# Patient Record
Sex: MALE | Race: WHITE | NOT HISPANIC OR LATINO | Employment: FULL TIME | URBAN - METROPOLITAN AREA
[De-identification: names, ages, dates, MRNs, and addresses within clinical notes are randomized per-mention and may not be internally consistent; named-entity substitution may affect disease eponyms.]

---

## 2017-07-17 ENCOUNTER — OFFICE VISIT (OUTPATIENT)
Dept: FAMILY MEDICINE | Facility: CLINIC | Age: 32
End: 2017-07-17
Payer: COMMERCIAL

## 2017-07-17 VITALS
WEIGHT: 201.25 LBS | TEMPERATURE: 98 F | SYSTOLIC BLOOD PRESSURE: 114 MMHG | DIASTOLIC BLOOD PRESSURE: 72 MMHG | RESPIRATION RATE: 16 BRPM | HEIGHT: 72 IN | BODY MASS INDEX: 27.26 KG/M2 | HEART RATE: 58 BPM | OXYGEN SATURATION: 99 %
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
## 2017-07-17 PROCEDURE — 99202 OFFICE O/P NEW SF 15 MIN: CPT | Mod: S$GLB,,, | Performed by: FAMILY MEDICINE

## 2017-07-17 PROCEDURE — 99999 PR PBB SHADOW E&M-NEW PATIENT-LVL III: CPT | Mod: PBBFAC,,, | Performed by: FAMILY MEDICINE

## 2017-07-17 RX ORDER — IBUPROFEN 200 MG
200 TABLET ORAL EVERY 6 HOURS PRN
COMMUNITY

## 2017-07-17 RX ORDER — DIPHENHYDRAMINE HCL 12.5MG/5ML
LIQUID (ML) ORAL 4 TIMES DAILY PRN
COMMUNITY
End: 2017-09-26

## 2017-07-26 NOTE — PROGRESS NOTES
Routine Office Visit    Patient Name: Dennis Leos Jr.    : 1985  MRN: [id number]    Subjective:  Dennis is a 32 y.o. male who presents today for:    1.  Bug bite - R arm.   Doesn't hurt, itch a little, no surrounding erythema.  Nothing makes it better or worse.  Happened 3 days ago.  No allergies that he knows of.  His mom wanted him to get checked out.     Past Medical History  History reviewed. No pertinent past medical history.    Past Surgical History  Past Surgical History:   Procedure Laterality Date    APPENDECTOMY      CYST REMOVAL         Family History  History reviewed. No pertinent family history.    Social History  Social History     Social History    Marital status:      Spouse name: N/A    Number of children: N/A    Years of education: N/A     Occupational History    Not on file.     Social History Main Topics    Smoking status: Never Smoker    Smokeless tobacco: Never Used    Alcohol use No    Drug use: No    Sexual activity: Not on file     Other Topics Concern    Not on file     Social History Narrative    No narrative on file       Current Medications  No current outpatient prescriptions on file prior to visit.     No current facility-administered medications on file prior to visit.        Allergies   Review of patient's allergies indicates:  No Known Allergies    Review of Systems (Pertinent positives)  Constititutional: Weight loss, excess fatigue, chills, fever, night sweats, weakness, loss of appetite  Skin: Rash, itching, lesions, color changes  Lungs: Cough, sputum, cough up blood, wheeze  Heart: Chest pain, angina, palpitations, extra heart beats  Stomach/Intestine: Heartburn, Nausea, vomiting, diarrhea, indigestion, bloating, constipation      /72 (BP Location: Right arm, Patient Position: Sitting, BP Method: Manual)   Pulse (!) 58   Temp 97.7 °F (36.5 °C) (Oral)   Resp 16   Ht 6' (1.829 m)   Wt 91.3 kg (201 lb 4.5 oz)   SpO2 99%   BMI 27.30 kg/m²      GENERAL APPEARANCE: in no apparent distress and well developed and well nourished  RESPIRATORY: appears well, vitals normal, no respiratory distress, acyanotic, normal RR, chest clear, no wheezing, crepitations, rhonchi, normal symmetric air entry  HEART: regular rate and rhythm, S1, S2 normal, no murmur, click, rub or gallop.    NEUROLOGIC: normal without focal findings, CN II-XII are intact.    Extremities: warm/well perfused.  No abnormal hair patterns.  No clubbing, cyanosis or edema.    SKIN: +pinpoint papules on R forearm, no surrounding erythema  PSYCH: Alert, oriented x 3, thought content appropriate, speech normal, pleasant and cooperative, good eye contact, well groomed, recall good, concentration/judgement good and apparently average intelligence.    Assessment/Plan:  Dennis Leos Jr. is a 32 y.o. male who presents today for :    Dennis was seen today for insect bite.    Diagnoses and all orders for this visit:    Bug bite, initial encounter  - benign  - f/u if does not resolve in 1 week  - calamine lotion for itchiness, benadryl    F/u PRN   F/u for annual in 1 month

## 2017-08-01 ENCOUNTER — OFFICE VISIT (OUTPATIENT)
Dept: FAMILY MEDICINE | Facility: CLINIC | Age: 32
End: 2017-08-01
Payer: COMMERCIAL

## 2017-08-01 VITALS
SYSTOLIC BLOOD PRESSURE: 106 MMHG | DIASTOLIC BLOOD PRESSURE: 82 MMHG | HEART RATE: 64 BPM | BODY MASS INDEX: 28.09 KG/M2 | TEMPERATURE: 99 F | HEIGHT: 71 IN | WEIGHT: 200.63 LBS | OXYGEN SATURATION: 97 %

## 2017-08-01 DIAGNOSIS — Z00.00 HEALTH CARE MAINTENANCE: ICD-10-CM

## 2017-08-01 DIAGNOSIS — Z13.220 NEED FOR LIPID SCREENING: ICD-10-CM

## 2017-08-01 PROCEDURE — 99999 PR PBB SHADOW E&M-EST. PATIENT-LVL III: CPT | Mod: PBBFAC,,, | Performed by: INTERNAL MEDICINE

## 2017-08-01 PROCEDURE — 99214 OFFICE O/P EST MOD 30 MIN: CPT | Mod: S$GLB,,, | Performed by: INTERNAL MEDICINE

## 2017-08-01 RX ORDER — BENZONATATE 100 MG/1
100 CAPSULE ORAL 3 TIMES DAILY PRN
Qty: 30 CAPSULE | Refills: 0 | Status: SHIPPED | OUTPATIENT
Start: 2017-08-01 | End: 2017-08-11

## 2017-08-01 RX ORDER — CODEINE PHOSPHATE AND GUAIFENESIN 10; 100 MG/5ML; MG/5ML
5 SOLUTION ORAL NIGHTLY PRN
Qty: 180 ML | Refills: 0 | Status: SHIPPED | OUTPATIENT
Start: 2017-08-01 | End: 2017-08-11

## 2017-08-01 NOTE — PROGRESS NOTES
SUBJECTIVE     Chief Complaint   Patient presents with    Cough     x 4 ays with phlem. OTC: Musinex.    Headache       HPI  Dennis Leos Jr. is a 32 y.o. male with multiple medical diagnoses as listed in the medical history and problem list that presents for evaluation of URI since Saturday. Pt reports a dry cough during the day, but it wakes him up in the middle of the night and his cough becomes productive. He also has a frontal lobe headache. He has been taking Mucinex without any improvement of symptoms. +night sweats, but denies any fever or chills. Denies any sick contacts. +recent travel to [place] via car.     PAST MEDICAL HISTORY:  History reviewed. No pertinent past medical history.    PAST SURGICAL HISTORY:  Past Surgical History:   Procedure Laterality Date    APPENDECTOMY      CYST REMOVAL         SOCIAL HISTORY:  Social History     Social History    Marital status:      Spouse name: N/A    Number of children: N/A    Years of education: N/A     Occupational History    Not on file.     Social History Main Topics    Smoking status: Never Smoker    Smokeless tobacco: Never Used    Alcohol use No    Drug use: No    Sexual activity: Not on file     Other Topics Concern    Not on file     Social History Narrative    No narrative on file       FAMILY HISTORY:  Family History   Problem Relation Age of Onset    No Known Problems Mother     Diabetes type II Father        ALLERGIES AND MEDICATIONS: updated and reviewed.  Review of patient's allergies indicates:  No Known Allergies  Current Outpatient Prescriptions   Medication Sig Dispense Refill    diphenhydrAMINE (BENYLIN) 12.5 mg/5 mL liquid Take by mouth 4 (four) times daily as needed for Allergies.      ibuprofen (ADVIL,MOTRIN) 200 MG tablet Take 200 mg by mouth every 6 (six) hours as needed for Pain.      benzonatate (TESSALON) 100 MG capsule Take 1 capsule (100 mg total) by mouth 3 (three) times daily as needed. 30 capsule 0     "guaifenesin-codeine 100-10 mg/5 ml (TUSSI-ORGANIDIN NR)  mg/5 mL syrup Take 5 mLs by mouth nightly as needed for Cough (MAY CAUSE DROWSINESS). 180 mL 0     No current facility-administered medications for this visit.        ROS  Review of Systems   Constitutional: Negative for chills and fever.   HENT: Positive for postnasal drip. Negative for hearing loss and sore throat.    Eyes: Negative for visual disturbance.   Respiratory: Positive for cough. Negative for shortness of breath.    Cardiovascular: Negative for chest pain, palpitations and leg swelling.   Gastrointestinal: Negative for abdominal pain, constipation, diarrhea, nausea and vomiting.   Genitourinary: Negative for dysuria, frequency and urgency.   Musculoskeletal: Negative for arthralgias, joint swelling and myalgias.   Skin: Negative for rash and wound.   Neurological: Positive for headaches.   Psychiatric/Behavioral: Negative for agitation and confusion. The patient is not nervous/anxious.          OBJECTIVE     Physical Exam  Vitals:    08/01/17 1552   BP: 106/82   Pulse: 64   Temp: 98.6 °F (37 °C)    Body mass index is 27.98 kg/m².  Weight: 91 kg (200 lb 9.9 oz)   Height: 5' 11" (180.3 cm)     Physical Exam   Constitutional: He is oriented to person, place, and time. He appears well-developed and well-nourished. No distress.   HENT:   Head: Normocephalic and atraumatic.   Right Ear: External ear normal.   Left Ear: External ear normal.   Nose: Nose normal.   Mouth/Throat: Oropharynx is clear and moist.   Eyes: Conjunctivae and EOM are normal. Right eye exhibits no discharge. Left eye exhibits no discharge. No scleral icterus.   Neck: Normal range of motion. Neck supple. No JVD present. No tracheal deviation present.   Cardiovascular: Normal rate, regular rhythm, normal heart sounds and intact distal pulses.  Exam reveals no gallop and no friction rub.    No murmur heard.  Pulmonary/Chest: Effort normal and breath sounds normal. No respiratory " distress. He has no wheezes.   No egophany, increased fremitus, or dullness to percussion   Abdominal: Soft. Bowel sounds are normal. He exhibits no distension and no mass. There is no tenderness. There is no rebound and no guarding.   Musculoskeletal: Normal range of motion. He exhibits no edema, tenderness or deformity.   Neurological: He is alert and oriented to person, place, and time. He exhibits normal muscle tone. Coordination normal.   Skin: Skin is warm and dry. No rash noted. No erythema.   Psychiatric: He has a normal mood and affect. His behavior is normal. Judgment and thought content normal.         Health Maintenance       Date Due Completion Date    Lipid Panel 1985 ---    Influenza Vaccine 08/01/2017 ---    TETANUS VACCINE 04/01/2024 4/1/2014            ASSESSMENT     32 y.o. male with     1. Cold    2. Health care maintenance    3. Need for lipid screening        PLAN:     1. Cold  - Pt advised to cover his mouth with elbow while coughing to contain germs and advised on proper hand hygiene  - Rest and keep well hydrated while on meds as below  - benzonatate (TESSALON) 100 MG capsule; Take 1 capsule (100 mg total) by mouth 3 (three) times daily as needed.  Dispense: 30 capsule; Refill: 0  - guaifenesin-codeine 100-10 mg/5 ml (TUSSI-ORGANIDIN NR)  mg/5 mL syrup; Take 5 mLs by mouth nightly as needed for Cough (MAY CAUSE DROWSINESS).  Dispense: 180 mL; Refill: 0    2. Health care maintenance  - CBC auto differential; Future  - Comprehensive metabolic panel; Future  - TSH; Future    3. Need for lipid screening  - Lipid panel; Future        RTC in 1-2 weeks as needed for any acute worsening of current condition or failure to improve     Lynn Jeffers MD  08/01/2017 4:10 PM        No Follow-up on file.

## 2017-09-14 ENCOUNTER — LAB VISIT (OUTPATIENT)
Dept: LAB | Facility: HOSPITAL | Age: 32
End: 2017-09-14
Attending: INTERNAL MEDICINE
Payer: COMMERCIAL

## 2017-09-14 ENCOUNTER — OFFICE VISIT (OUTPATIENT)
Dept: FAMILY MEDICINE | Facility: CLINIC | Age: 32
End: 2017-09-14
Payer: COMMERCIAL

## 2017-09-14 ENCOUNTER — TELEPHONE (OUTPATIENT)
Dept: FAMILY MEDICINE | Facility: CLINIC | Age: 32
End: 2017-09-14

## 2017-09-14 VITALS
OXYGEN SATURATION: 98 % | WEIGHT: 199.94 LBS | HEART RATE: 62 BPM | DIASTOLIC BLOOD PRESSURE: 82 MMHG | TEMPERATURE: 99 F | SYSTOLIC BLOOD PRESSURE: 124 MMHG | BODY MASS INDEX: 27.08 KG/M2 | HEIGHT: 72 IN

## 2017-09-14 DIAGNOSIS — Z00.00 HEALTH CARE MAINTENANCE: ICD-10-CM

## 2017-09-14 DIAGNOSIS — N50.819 TESTICULAR PAIN: Primary | ICD-10-CM

## 2017-09-14 DIAGNOSIS — Z13.220 NEED FOR LIPID SCREENING: ICD-10-CM

## 2017-09-14 LAB
ALBUMIN SERPL BCP-MCNC: 4.1 G/DL
ALP SERPL-CCNC: 73 U/L
ALT SERPL W/O P-5'-P-CCNC: 39 U/L
ANION GAP SERPL CALC-SCNC: 11 MMOL/L
AST SERPL-CCNC: 17 U/L
BASOPHILS # BLD AUTO: 0.01 K/UL
BASOPHILS NFR BLD: 0.2 %
BILIRUB SERPL-MCNC: 0.8 MG/DL
BUN SERPL-MCNC: 16 MG/DL
CALCIUM SERPL-MCNC: 9.9 MG/DL
CHLORIDE SERPL-SCNC: 106 MMOL/L
CHOLEST SERPL-MCNC: 238 MG/DL
CHOLEST/HDLC SERPL: 6 {RATIO}
CO2 SERPL-SCNC: 26 MMOL/L
CREAT SERPL-MCNC: 1 MG/DL
DIFFERENTIAL METHOD: ABNORMAL
EOSINOPHIL # BLD AUTO: 0.1 K/UL
EOSINOPHIL NFR BLD: 1.5 %
ERYTHROCYTE [DISTWIDTH] IN BLOOD BY AUTOMATED COUNT: 12.2 %
EST. GFR  (AFRICAN AMERICAN): >60 ML/MIN/1.73 M^2
EST. GFR  (NON AFRICAN AMERICAN): >60 ML/MIN/1.73 M^2
GLUCOSE SERPL-MCNC: 78 MG/DL
HCT VFR BLD AUTO: 42.6 %
HDLC SERPL-MCNC: 40 MG/DL
HDLC SERPL: 16.8 %
HGB BLD-MCNC: 14.7 G/DL
LDLC SERPL CALC-MCNC: 163.4 MG/DL
LYMPHOCYTES # BLD AUTO: 1.4 K/UL
LYMPHOCYTES NFR BLD: 28.8 %
MCH RBC QN AUTO: 31.7 PG
MCHC RBC AUTO-ENTMCNC: 34.5 G/DL
MCV RBC AUTO: 92 FL
MONOCYTES # BLD AUTO: 0.3 K/UL
MONOCYTES NFR BLD: 6.3 %
NEUTROPHILS # BLD AUTO: 3 K/UL
NEUTROPHILS NFR BLD: 63.2 %
NONHDLC SERPL-MCNC: 198 MG/DL
PLATELET # BLD AUTO: 217 K/UL
PMV BLD AUTO: 10.3 FL
POTASSIUM SERPL-SCNC: 4.3 MMOL/L
PROT SERPL-MCNC: 7.9 G/DL
RBC # BLD AUTO: 4.64 M/UL
SODIUM SERPL-SCNC: 143 MMOL/L
TRIGL SERPL-MCNC: 173 MG/DL
TSH SERPL DL<=0.005 MIU/L-ACNC: 2.38 UIU/ML
WBC # BLD AUTO: 4.76 K/UL

## 2017-09-14 PROCEDURE — 3008F BODY MASS INDEX DOCD: CPT | Mod: S$GLB,,, | Performed by: INTERNAL MEDICINE

## 2017-09-14 PROCEDURE — 99213 OFFICE O/P EST LOW 20 MIN: CPT | Mod: S$GLB,,, | Performed by: INTERNAL MEDICINE

## 2017-09-14 PROCEDURE — 36415 COLL VENOUS BLD VENIPUNCTURE: CPT

## 2017-09-14 PROCEDURE — 99999 PR PBB SHADOW E&M-EST. PATIENT-LVL III: CPT | Mod: PBBFAC,,, | Performed by: INTERNAL MEDICINE

## 2017-09-14 PROCEDURE — 85025 COMPLETE CBC W/AUTO DIFF WBC: CPT

## 2017-09-14 PROCEDURE — 80053 COMPREHEN METABOLIC PANEL: CPT

## 2017-09-14 PROCEDURE — 80061 LIPID PANEL: CPT

## 2017-09-14 PROCEDURE — 84443 ASSAY THYROID STIM HORMONE: CPT

## 2017-09-14 NOTE — TELEPHONE ENCOUNTER
----- Message from Randa Decker sent at 9/14/2017 10:35 AM CDT -----  Contact: self  Patient is returning a call. Patient can be reached at [phone].        Thanks,

## 2017-09-14 NOTE — PROGRESS NOTES
SUBJECTIVE     Chief Complaint   Patient presents with    Groin Pain     pulled muscle x 1 week ago       HPI  Dennis Leos Jr. is a 32 y.o. male with multiple medical diagnoses as listed in the medical history and problem list that presents for evaluation of groin pain x 1 week. Pt reports lifting a couch and the next day developed some R sided groin discomfort, which he describes a heaviness. He started to take Ibuprofen and sit in warm baths with resolution of symptoms, but then pt went to the gym and lifted weights. He started to have L sided groin pain which is burning/tingling in nature at a 1-2/10 and constant in nature. He has been taking Ibuprofen and applying ice with good relief of symptoms.     PAST MEDICAL HISTORY:  History reviewed. No pertinent past medical history.    PAST SURGICAL HISTORY:  Past Surgical History:   Procedure Laterality Date    APPENDECTOMY      CYST REMOVAL         SOCIAL HISTORY:  Social History     Social History    Marital status:      Spouse name: N/A    Number of children: N/A    Years of education: N/A     Occupational History    Not on file.     Social History Main Topics    Smoking status: Never Smoker    Smokeless tobacco: Never Used    Alcohol use No    Drug use: No    Sexual activity: Not on file     Other Topics Concern    Not on file     Social History Narrative    No narrative on file       FAMILY HISTORY:  Family History   Problem Relation Age of Onset    No Known Problems Mother     Diabetes type II Father        ALLERGIES AND MEDICATIONS: updated and reviewed.  Review of patient's allergies indicates:  No Known Allergies  Current Outpatient Prescriptions   Medication Sig Dispense Refill    diphenhydrAMINE (BENYLIN) 12.5 mg/5 mL liquid Take by mouth 4 (four) times daily as needed for Allergies.      ibuprofen (ADVIL,MOTRIN) 200 MG tablet Take 200 mg by mouth every 6 (six) hours as needed for Pain.       No current facility-administered  medications for this visit.        ROS  Review of Systems   Constitutional: Negative for chills and fever.   HENT: Negative for hearing loss and sore throat.    Eyes: Negative for visual disturbance.   Respiratory: Negative for cough and shortness of breath.    Cardiovascular: Negative for chest pain, palpitations and leg swelling.   Gastrointestinal: Negative for abdominal pain, constipation, diarrhea, nausea and vomiting.   Genitourinary: Negative for dysuria, frequency, scrotal swelling, testicular pain and urgency.   Musculoskeletal: Negative for arthralgias, joint swelling and myalgias.   Skin: Negative for rash and wound.   Neurological: Negative for headaches.   Psychiatric/Behavioral: Negative for agitation and confusion. The patient is not nervous/anxious.          OBJECTIVE     Physical Exam  Vitals:    09/14/17 0921   BP: 124/82   Pulse: 62   Temp: 98.5 °F (36.9 °C)    Body mass index is 27.12 kg/m².  Weight: 90.7 kg (199 lb 15.3 oz)   Height: 6' (182.9 cm)     Physical Exam   Constitutional: He is oriented to person, place, and time. He appears well-developed and well-nourished. No distress.   HENT:   Head: Normocephalic and atraumatic.   Right Ear: External ear normal.   Left Ear: External ear normal.   Nose: Nose normal.   Mouth/Throat: Oropharynx is clear and moist.   Eyes: Conjunctivae and EOM are normal. Right eye exhibits no discharge. Left eye exhibits no discharge. No scleral icterus.   Neck: Normal range of motion. Neck supple. No JVD present. No tracheal deviation present.   Cardiovascular: Normal rate, regular rhythm, normal heart sounds and intact distal pulses.  Exam reveals no gallop and no friction rub.    No murmur heard.  Pulmonary/Chest: Effort normal and breath sounds normal. No respiratory distress. He has no wheezes.   Abdominal: Soft. Bowel sounds are normal. He exhibits no distension and no mass. There is no tenderness. There is no rebound and no guarding.   Musculoskeletal: Normal  range of motion. He exhibits no edema, tenderness or deformity.   Neurological: He is alert and oriented to person, place, and time. He exhibits normal muscle tone. Coordination normal.   Skin: Skin is warm and dry. No rash noted. No erythema.   Psychiatric: He has a normal mood and affect. His behavior is normal. Judgment and thought content normal.         Health Maintenance       Date Due Completion Date    Lipid Panel 1984 ---    Influenza Vaccine 10/17/2017 (Originally 8/1/2017) ---    TETANUS VACCINE 04/01/2024 4/1/2014            ASSESSMENT     32 y.o. male with     1. Testicular pain        PLAN:     1. Testicular pain  - Pt advised against heavy lifting and intense physical activity; suspect possible hernia vs hydrocele vs varicocele  - Continue rest and apply ice  - Take NSAIDs or Tylenol prn pain  - US Scrotum And Testicles; Future        RTC in 2 weeks for repeat assessment of current treatment plan       Lynn Jeffers MD  09/14/2017 9:36 AM        No Follow-up on file.

## 2017-09-15 ENCOUNTER — HOSPITAL ENCOUNTER (OUTPATIENT)
Dept: RADIOLOGY | Facility: HOSPITAL | Age: 32
Discharge: HOME | End: 2017-09-15
Attending: INTERNAL MEDICINE
Payer: COMMERCIAL

## 2017-09-15 DIAGNOSIS — N50.819 TESTICULAR PAIN: ICD-10-CM

## 2017-09-15 DIAGNOSIS — I86.1 LEFT VARICOCELE: Primary | ICD-10-CM

## 2017-09-15 PROCEDURE — 76870 US EXAM SCROTUM: CPT | Mod: TC

## 2017-09-15 PROCEDURE — 76870 US EXAM SCROTUM: CPT | Mod: 26,,, | Performed by: RADIOLOGY

## 2017-09-18 ENCOUNTER — TELEPHONE (OUTPATIENT)
Dept: FAMILY MEDICINE | Facility: CLINIC | Age: 32
End: 2017-09-18

## 2017-09-18 NOTE — TELEPHONE ENCOUNTER
Please Advise    RESULTS:  Comparison: None.     Results: Scrotal ultrasound performed.  The testicles are symmetric in echogenicity and size.  The right testicle measures 3.6 x 2.9 x 2.4 cm.  The left testicle measures 3.7 x 2.8 x 2.3 cm.  No testicular masses.  The epididymides are unremarkable allowing for epididymal head cysts bilaterally.  No hydrocele bilaterally. There is prominence of the pampiniform plexus on the left measuring greater than 3 mm which could suggest left-sided varicocele. No evidence for right-sided varicocele. Symmetric arterial and venous flow demonstrated to both testicles.  IMPRESSION:      Findings which could suggest left-sided varicocele.  Otherwise unremarkable scrotal ultrasound

## 2017-09-18 NOTE — TELEPHONE ENCOUNTER
Returned pt's phone call and explained results along with next steps. All questions/concerns addressed and pt voiced understanding.

## 2017-09-18 NOTE — TELEPHONE ENCOUNTER
----- Message from Flor Escalera sent at 9/18/2017 12:43 PM CDT -----  Contact: [phone] PT  Calling to see what next step to take since US last week

## 2017-09-26 ENCOUNTER — OFFICE VISIT (OUTPATIENT)
Dept: UROLOGY | Facility: CLINIC | Age: 32
End: 2017-09-26
Payer: COMMERCIAL

## 2017-09-26 VITALS
BODY MASS INDEX: 27.08 KG/M2 | SYSTOLIC BLOOD PRESSURE: 128 MMHG | DIASTOLIC BLOOD PRESSURE: 78 MMHG | HEIGHT: 72 IN | WEIGHT: 199.94 LBS | HEART RATE: 82 BPM

## 2017-09-26 DIAGNOSIS — N50.89 SCROTAL MASS: Primary | ICD-10-CM

## 2017-09-26 PROCEDURE — 99999 PR PBB SHADOW E&M-EST. PATIENT-LVL III: CPT | Mod: PBBFAC,,, | Performed by: UROLOGY

## 2017-09-26 PROCEDURE — 99203 OFFICE O/P NEW LOW 30 MIN: CPT | Mod: S$GLB,,, | Performed by: UROLOGY

## 2017-09-26 PROCEDURE — 3008F BODY MASS INDEX DOCD: CPT | Mod: S$GLB,,, | Performed by: UROLOGY

## 2017-09-26 NOTE — LETTER
September 26, 2017      Lynn Jeffers MD  [address]  [address]  [place] [address]           [facility]  [address] [facility] [address]  [place] [address]  Phone: [phone]  Fax: [phone]          Patient: Dennis Leos Jr.   MR Number: [id number]   YOB: 1985   Date of Visit: 9/26/2017       Dear Dr. Lynn Jeffers:    Thank you for referring Dennis Leos to me for evaluation. Attached you will find relevant portions of my assessment and plan of care.    If you have questions, please do not hesitate to call me. I look forward to following Dennis Leos along with you.    Sincerely,    Kodak Bhatt Jr., MD    Enclosure  CC:  No Recipients    If you would like to receive this communication electronically, please contact [email or web address] or [phone] to request more information on [employer] Link access.    For providers and/or their staff who would like to refer a patient to [facility], please contact us through our one-stop-shop provider referral line, [facility], at [phone].    If you feel you have received this communication in error or would no longer like to receive these types of communications, please e-mail [email or web address]

## 2017-09-26 NOTE — PROGRESS NOTES
Subjective:       Patient ID: Dennis Leos Jr. is a 32 y.o. male.    Chief Complaint: left varicocele    HPI patient is a consult for left varicocele.  He has one child.  He was having pain which is now abated with the use of Motrin.  He has scrotal ultrasound shows small left varicocele.  He's not really instituted in having more children at this time fever chills nausea vomiting or lower tract irritative symptoms    History reviewed. No pertinent past medical history.    Past Surgical History:   Procedure Laterality Date    APPENDECTOMY      CYST REMOVAL         Family History   Problem Relation Age of Onset    No Known Problems Mother     Diabetes type II Father        Social History     Social History    Marital status:      Spouse name: N/A    Number of children: N/A    Years of education: N/A     Occupational History    Not on file.     Social History Main Topics    Smoking status: Never Smoker    Smokeless tobacco: Never Used    Alcohol use No    Drug use: No    Sexual activity: Yes     Partners: Female     Other Topics Concern    Not on file     Social History Narrative    No narrative on file       Allergies:  Review of patient's allergies indicates no known allergies.    Medications:    Current Outpatient Prescriptions:     ibuprofen (ADVIL,MOTRIN) 200 MG tablet, Take 200 mg by mouth every 6 (six) hours as needed for Pain., Disp: , Rfl:     Review of Systems   Constitutional: Negative for activity change, appetite change, chills, diaphoresis, fatigue, fever and unexpected weight change.   HENT: Negative for congestion, dental problem, hearing loss, mouth sores, postnasal drip, rhinorrhea, sinus pressure and trouble swallowing.    Eyes: Negative for pain, discharge and itching.   Respiratory: Negative for apnea, cough, choking, chest tightness, shortness of breath and wheezing.    Cardiovascular: Negative for chest pain, palpitations and leg swelling.   Gastrointestinal: Negative for  abdominal distention, abdominal pain, anal bleeding, blood in stool, constipation, diarrhea, nausea, rectal pain and vomiting.   Endocrine: Negative for polydipsia and polyuria.   Genitourinary: Negative for decreased urine volume, difficulty urinating, discharge, dysuria, enuresis, flank pain, frequency, genital sores, hematuria, penile pain, penile swelling, scrotal swelling, testicular pain and urgency.   Musculoskeletal: Negative for arthralgias, back pain and myalgias.   Skin: Negative for color change, rash and wound.   Neurological: Negative for dizziness, syncope, speech difficulty, light-headedness and headaches.   Hematological: Negative for adenopathy. Does not bruise/bleed easily.   Psychiatric/Behavioral: Negative for behavioral problems, confusion, hallucinations and sleep disturbance.       Objective:      Physical Exam   Constitutional: He appears well-developed.   HENT:   Head: Normocephalic.   Cardiovascular: Normal rate.    Pulmonary/Chest: Effort normal.   Abdominal: Soft.   Genitourinary:   Genitourinary Comments: Patient has a grade 1 possibly 2 left varicocele testes are normal bilaterally and equal in size.  No hernias are appreciated   Neurological: He is alert.   Skin: Skin is warm.     Psychiatric: He has a normal mood and affect.       Assessment:       1. Scrotal mass        Plan:       Dennis was seen today for left varicocele.    Diagnoses and all orders for this visit:    Scrotal mass     patient has grade 2 left-sided varicocele without infertility problems and his pain has improved through the use of anti-inflammatories I will see him back when necessary.  We discussed possibility of infertility and pain associated with the varicoceles.  I would not recommend surgery

## 2021-07-29 ENCOUNTER — IMMUNIZATION (OUTPATIENT)
Dept: INTERNAL MEDICINE | Facility: CLINIC | Age: 36
End: 2021-07-29
Payer: COMMERCIAL

## 2021-07-29 DIAGNOSIS — Z23 NEED FOR VACCINATION: Primary | ICD-10-CM

## 2021-07-29 PROCEDURE — 91300 COVID-19, MRNA, LNP-S, PF, 30 MCG/0.3 ML DOSE VACCINE: CPT | Mod: PBBFAC | Performed by: INTERNAL MEDICINE

## 2021-08-19 ENCOUNTER — IMMUNIZATION (OUTPATIENT)
Dept: PRIMARY CARE CLINIC | Facility: CLINIC | Age: 36
End: 2021-08-19
Payer: COMMERCIAL

## 2021-08-19 DIAGNOSIS — Z23 NEED FOR VACCINATION: Primary | ICD-10-CM

## 2021-08-19 PROCEDURE — 0002A COVID-19, MRNA, LNP-S, PF, 30 MCG/0.3 ML DOSE VACCINE: CPT | Mod: CV19,S$GLB,, | Performed by: INTERNAL MEDICINE

## 2021-08-19 PROCEDURE — 0002A COVID-19, MRNA, LNP-S, PF, 30 MCG/0.3 ML DOSE VACCINE: ICD-10-PCS | Mod: CV19,S$GLB,, | Performed by: INTERNAL MEDICINE

## 2021-08-19 PROCEDURE — 91300 COVID-19, MRNA, LNP-S, PF, 30 MCG/0.3 ML DOSE VACCINE: CPT | Mod: S$GLB,,, | Performed by: INTERNAL MEDICINE

## 2021-08-19 PROCEDURE — 91300 COVID-19, MRNA, LNP-S, PF, 30 MCG/0.3 ML DOSE VACCINE: ICD-10-PCS | Mod: S$GLB,,, | Performed by: INTERNAL MEDICINE

## 2024-11-19 DIAGNOSIS — M25.562 ACUTE PAIN OF LEFT KNEE: Primary | ICD-10-CM

## 2024-11-20 ENCOUNTER — OFFICE VISIT (OUTPATIENT)
Dept: ORTHOPEDICS | Facility: CLINIC | Age: 39
End: 2024-11-20
Payer: COMMERCIAL

## 2024-11-20 ENCOUNTER — HOSPITAL ENCOUNTER (OUTPATIENT)
Dept: RADIOLOGY | Facility: HOSPITAL | Age: 39
Discharge: HOME | End: 2024-11-20
Attending: ORTHOPAEDIC SURGERY
Payer: COMMERCIAL

## 2024-11-20 ENCOUNTER — TELEPHONE (OUTPATIENT)
Dept: ORTHOPEDICS | Facility: CLINIC | Age: 39
End: 2024-11-20
Payer: COMMERCIAL

## 2024-11-20 DIAGNOSIS — S83.242A ACUTE MEDIAL MENISCUS TEAR OF LEFT KNEE, INITIAL ENCOUNTER: Primary | ICD-10-CM

## 2024-11-20 DIAGNOSIS — M25.562 LEFT KNEE PAIN, UNSPECIFIED CHRONICITY: Primary | ICD-10-CM

## 2024-11-20 DIAGNOSIS — M25.562 LEFT KNEE PAIN, UNSPECIFIED CHRONICITY: ICD-10-CM

## 2024-11-20 PROCEDURE — 73562 X-RAY EXAM OF KNEE 3: CPT | Mod: 26,LT,, | Performed by: STUDENT IN AN ORGANIZED HEALTH CARE EDUCATION/TRAINING PROGRAM

## 2024-11-20 PROCEDURE — 1159F MED LIST DOCD IN RCRD: CPT | Mod: CPTII,S$GLB,, | Performed by: ORTHOPAEDIC SURGERY

## 2024-11-20 PROCEDURE — 73562 X-RAY EXAM OF KNEE 3: CPT | Mod: TC,PN,LT

## 2024-11-20 PROCEDURE — 99204 OFFICE O/P NEW MOD 45 MIN: CPT | Mod: S$GLB,,, | Performed by: ORTHOPAEDIC SURGERY

## 2024-11-20 PROCEDURE — 99999 PR PBB SHADOW E&M-EST. PATIENT-LVL II: CPT | Mod: PBBFAC,,, | Performed by: ORTHOPAEDIC SURGERY

## 2024-11-20 RX ORDER — DICLOFENAC SODIUM 10 MG/G
2 GEL TOPICAL 3 TIMES DAILY
Qty: 100 G | Refills: 2 | Status: SHIPPED | OUTPATIENT
Start: 2024-11-20

## 2024-11-20 NOTE — TELEPHONE ENCOUNTER
Called and spoke to pt to clarify what problem he was coming in to see Dr. Sampson for. Pt stated he has left knee pain. Told pt he will receive xray prior to appt. Pt verbalized understanding.

## 2024-11-20 NOTE — PROGRESS NOTES
[facility], Orthopedics and Sports Medicine  [facility]    New Patient Knee Office Visit  11/20/2024       Subjective:      Dennis Leos Jr. is a 39 y.o. male referred by Panchitorefslimeal Self for evaluation and treatment of left knee pain. This is evaluated as a personal injury.  The patient has the following symptoms: pain located right knee, medial knee .  The symptoms began about 8 months ago atraumatically but yesterday had a fall then popped and had medial sided knee pain.  He gets knee popping and it gets locked occasionally and is painful.      Plays recreational softball.     Works as .     Outside reports reviewed: historical medical records, office notes, and radiology reports.    History reviewed. No pertinent past medical history.    There is no problem list on file for this patient.      Past Surgical History:   Procedure Laterality Date    APPENDECTOMY      CYST REMOVAL          Current Outpatient Medications   Medication Instructions    diclofenac sodium (VOLTAREN) 2 g, Topical (Top), 3 times daily    ibuprofen (ADVIL,MOTRIN) 200 mg, Every 6 hours PRN        Review of patient's allergies indicates:  No Known Allergies    Social History     Socioeconomic History    Marital status: Unknown   Tobacco Use    Smoking status: Never    Smokeless tobacco: Never   Substance and Sexual Activity    Alcohol use: No    Drug use: No    Sexual activity: Yes     Partners: Female   Social History Narrative    ** Merged History Encounter **            Family History   Problem Relation Name Age of Onset    No Known Problems Mother      Diabetes type II Father           Review of Systems   Constitutional: Negative for chills and fever.   HENT:  Negative for hearing loss.    Eyes:  Negative for blurred vision.   Cardiovascular:  Negative for chest pain.   Respiratory:  Negative for shortness of breath.    Gastrointestinal:  Negative for abdominal pain.   Neurological:  Negative for  light-headedness.            Objective:      General    Constitutional: He is oriented to person, place, and time. He appears well-developed and well-nourished.   HENT:   Head: Normocephalic and atraumatic.   Eyes: EOM are normal.   Cardiovascular:  Normal rate.            Pulmonary/Chest: Effort normal.   Neurological: He is alert and oriented to person, place, and time.   Psychiatric: He has a normal mood and affect.     General Musculoskeletal Exam   Gait: abnormal       Right Knee Exam     Inspection   Erythema: absent  Swelling: absent  Effusion: absent    Tenderness   The patient is experiencing no tenderness.     Range of Motion   Extension:  0   Flexion:  130     Tests   Meniscus   Wilber:  Medial - negative Lateral - negative  Ligament Examination   Lachman: normal (-1 to 2mm)   PCL-Posterior Drawer: normal (0 to 2mm)     MCL - Valgus: normal (0 to 2mm)  LCL - Varus: normal  Patella   Patellar apprehension: negative    Other   Sensation: normal    Left Knee Exam     Inspection   Erythema: absent  Swelling: absent  Effusion: present    Tenderness   The patient tender to palpation of the medial joint line.    Range of Motion   Extension:  0   Flexion:  110     Tests   Meniscus   Wilber:  Medial - positive Lateral - negative  Stability   Lachman: normal (-1 to 2mm)   PCL-Posterior Drawer: normal (0 to 2mm)  MCL - Valgus: normal (0 to 2mm)  LCL - Varus: normal (0 to 2mm)  Patella   Patellar apprehension: negative    Other   Sensation: normal    Muscle Strength   Right Lower Extremity   Quadriceps:  5/5   Hamstrin/5   Left Lower Extremity   Quadriceps:  5/5   Hamstrin/5     Vascular Exam     Right Pulses    Posterior Tibial:      2+        Left Pulses    Posterior Tibial:      2+          Imaging:  Radiographs of the left knee taken taken 2024 were personally reviewed from the [facility] EMR.  Multiple views of the knee are available today for review, including a standing AP, a standing  notch view, lateral view, and a merchant view.  The tibiofemoral compartment demonstrates minimal degenerative changes.  The patellofemoral compartment demonstrates mild degenerative changes .  No acute fractures or dislocations are noted in these images.       Procedures          Assessment:       Dennis Leos Jr. is a 39 y.o. male seen in the office today. The encounter diagnosis was Acute medial meniscus tear of left knee, initial encounter.  Further work-up is recommended at this time. MRI of the knee indicated due to concern for acute meniscus tear given locking and other mechanical symptoms. The natural history and expected course discussed with patient. Various treatment options were discussed, including their risks and benefits. All of the patient's questions were answered.     Plan:      Tylenol 650mg TID, PRN pain.  Voltaren 1% topical gel, apply to affected area TID, PRN pain.  MRI left knee.  Follow up visit in approximately 2 weeks to discuss results of MRI.         Perez Sampson IV, MD   of Clinical Orthopedics  Department of Orthopedic Surgery  [facility]  Office: [phone]  Website: [email or web address]      Orders Placed This Encounter    MRI Knee Without Contrast Left    diclofenac sodium (VOLTAREN) 1 % Gel

## 2024-11-21 ENCOUNTER — TELEPHONE (OUTPATIENT)
Dept: ORTHOPEDICS | Facility: CLINIC | Age: 39
End: 2024-11-21
Payer: COMMERCIAL

## 2024-11-21 ENCOUNTER — HOSPITAL ENCOUNTER (OUTPATIENT)
Dept: RADIOLOGY | Facility: HOSPITAL | Age: 39
Discharge: HOME | End: 2024-11-21
Attending: ORTHOPAEDIC SURGERY
Payer: COMMERCIAL

## 2024-11-21 DIAGNOSIS — S83.242A ACUTE MEDIAL MENISCUS TEAR OF LEFT KNEE, INITIAL ENCOUNTER: ICD-10-CM

## 2024-11-21 PROCEDURE — 73721 MRI JNT OF LWR EXTRE W/O DYE: CPT | Mod: 26,LT,, | Performed by: RADIOLOGY

## 2024-11-21 PROCEDURE — 73721 MRI JNT OF LWR EXTRE W/O DYE: CPT | Mod: TC,LT

## 2024-11-25 ENCOUNTER — PATIENT MESSAGE (OUTPATIENT)
Dept: ORTHOPEDICS | Facility: CLINIC | Age: 39
End: 2024-11-25
Payer: COMMERCIAL

## 2024-11-25 ENCOUNTER — TELEPHONE (OUTPATIENT)
Dept: ORTHOPEDICS | Facility: CLINIC | Age: 39
End: 2024-11-25
Payer: COMMERCIAL

## 2024-11-25 NOTE — TELEPHONE ENCOUNTER
Spoke with patient and advised him that Dr. Sampson is out this week. Patient was advised of Dr. Sampson being out this week at last office visit. Patient would like to schedule surgery with Dr. Sapmson this week. I advised patient that he will not have surgery this week and he is schedule to see Dr. Sampson for his follow up MRI on Wednesday 12/3/24 to discuss results and decide if he need surgery or not. Patient verbally understand.

## 2024-11-25 NOTE — TELEPHONE ENCOUNTER
----- Message from Mikael sent at 11/25/2024  4:20 PM CST -----  .Type:  Needs Medical Advice    Who Called: pt     Would the patient rather a call back or a response via [facility]? Call back  Best Call Back Number: [phone]  Additional Information:     Pt would like a call back regarding his procedure

## 2024-12-05 ENCOUNTER — OFFICE VISIT (OUTPATIENT)
Dept: ORTHOPEDICS | Facility: CLINIC | Age: 39
End: 2024-12-05
Payer: COMMERCIAL

## 2024-12-05 ENCOUNTER — HOSPITAL ENCOUNTER (OUTPATIENT)
Dept: RADIOLOGY | Facility: HOSPITAL | Age: 39
Discharge: HOME | End: 2024-12-05
Attending: ORTHOPAEDIC SURGERY
Payer: COMMERCIAL

## 2024-12-05 VITALS — HEIGHT: 72 IN | BODY MASS INDEX: 27.08 KG/M2 | WEIGHT: 199.94 LBS

## 2024-12-05 DIAGNOSIS — Z01.818 PREOP EXAMINATION: ICD-10-CM

## 2024-12-05 DIAGNOSIS — S83.242A ACUTE MEDIAL MENISCUS TEAR OF LEFT KNEE, INITIAL ENCOUNTER: Primary | ICD-10-CM

## 2024-12-05 DIAGNOSIS — M25.562 LEFT KNEE PAIN, UNSPECIFIED CHRONICITY: Primary | ICD-10-CM

## 2024-12-05 PROCEDURE — 3008F BODY MASS INDEX DOCD: CPT | Mod: CPTII,S$GLB,, | Performed by: ORTHOPAEDIC SURGERY

## 2024-12-05 PROCEDURE — 71045 X-RAY EXAM CHEST 1 VIEW: CPT | Mod: TC,FY

## 2024-12-05 PROCEDURE — 99999 PR PBB SHADOW E&M-EST. PATIENT-LVL IV: CPT | Mod: PBBFAC,,, | Performed by: ORTHOPAEDIC SURGERY

## 2024-12-05 PROCEDURE — 71045 X-RAY EXAM CHEST 1 VIEW: CPT | Mod: 26,,, | Performed by: RADIOLOGY

## 2024-12-05 PROCEDURE — 99214 OFFICE O/P EST MOD 30 MIN: CPT | Mod: S$GLB,,, | Performed by: ORTHOPAEDIC SURGERY

## 2024-12-05 PROCEDURE — 1159F MED LIST DOCD IN RCRD: CPT | Mod: CPTII,S$GLB,, | Performed by: ORTHOPAEDIC SURGERY

## 2024-12-05 NOTE — H&P (VIEW-ONLY)
[facility], Orthopedics and Sports Medicine  [facility]    Established Patient Knee Office Visit  12/05/2024     Diagnosis:  Left medial meniscus tear, bucket handle     Subjective:      Dennis Leos Jr. is a 39 y.o. male who returns for evaluation and treatment of the left knee.    The patient has the following symptoms: pain located medial left knee . The symptoms are not improving.  The symptoms also include catching, locking, mechanical block to motion.    Sent for MRI to elucidate knee pain cause.      Outside reports reviewed: historical medical records, office notes, and radiology reports.    History reviewed. No pertinent past medical history.    There is no problem list on file for this patient.      Past Surgical History:   Procedure Laterality Date    APPENDECTOMY      CYST REMOVAL          Current Outpatient Medications   Medication Instructions    diclofenac sodium (VOLTAREN) 2 g, Topical (Top), 3 times daily    ibuprofen (ADVIL,MOTRIN) 200 mg, Every 6 hours PRN        Review of patient's allergies indicates:  No Known Allergies    Social History     Socioeconomic History    Marital status: Unknown   Tobacco Use    Smoking status: Never    Smokeless tobacco: Never   Substance and Sexual Activity    Alcohol use: No    Drug use: No    Sexual activity: Yes     Partners: Female   Social History Narrative    ** Merged History Encounter **            Family History   Problem Relation Name Age of Onset    No Known Problems Mother      Diabetes type II Father           Review of Systems   Constitutional: Negative for chills and fever.   HENT:  Negative for hearing loss.    Eyes:  Negative for blurred vision.   Cardiovascular:  Negative for chest pain.   Respiratory:  Negative for shortness of breath.    Gastrointestinal:  Negative for abdominal pain.   Neurological:  Negative for light-headedness.          Objective:      General    Constitutional: He is oriented to person, place, and  time. He appears well-developed and well-nourished.   HENT:   Head: Normocephalic and atraumatic.   Eyes: EOM are normal.   Cardiovascular:  Normal rate.            Pulmonary/Chest: Effort normal.   Neurological: He is alert and oriented to person, place, and time.   Psychiatric: He has a normal mood and affect.     General Musculoskeletal Exam   Gait: abnormal       Right Knee Exam     Inspection   Erythema: absent  Swelling: absent  Effusion: absent    Tenderness   The patient is experiencing no tenderness.     Range of Motion   Extension:  0   Flexion:  130     Tests   Meniscus   Wilber:  Medial - negative Lateral - negative  Ligament Examination   Lachman: normal (-1 to 2mm)   PCL-Posterior Drawer: normal (0 to 2mm)     MCL - Valgus: normal (0 to 2mm)  LCL - Varus: normal  Patella   Patellar apprehension: negative    Other   Sensation: normal    Left Knee Exam     Inspection   Erythema: absent  Swelling: absent  Effusion: present    Tenderness   The patient tender to palpation of the medial joint line.    Range of Motion   Extension:  0   Flexion:  110     Tests   Meniscus   Wilber:  Medial - positive Lateral - negative  Stability   Lachman: normal (-1 to 2mm)   PCL-Posterior Drawer: normal (0 to 2mm)  MCL - Valgus: normal (0 to 2mm)  LCL - Varus: normal (0 to 2mm)  Patella   Patellar apprehension: negative    Other   Sensation: normal    Muscle Strength   Right Lower Extremity   Quadriceps:  5/5   Hamstrin/5   Left Lower Extremity   Quadriceps:  5/5   Hamstrin/5     Vascular Exam     Right Pulses    Posterior Tibial:      2+        Left Pulses    Posterior Tibial:      2+          Imaging:  MRI of the left knee taken taken    was personally reviewed from the [facility].  Multiple T1 and T2 sequences including axial, coronal, and sagittal views were reviewed.  No acute fractures or dislocations are noted in these images.  Agree with the report below:   Details    Reading Physician  Reading Date Result Priority   Reddy Vergara MD  [phone] 11/21/2024 ASAP     Narrative & Impression  EXAMINATION:  MRI KNEE WITHOUT CONTRAST LEFT     CLINICAL HISTORY:  Knee trauma, internal derangement suspected, xray done;Other tear of medial meniscus, current injury, left knee, initial encounter     TECHNIQUE:  Multiplanar, multisequence MRI of the left knee performed per routine protocol without contrast.     COMPARISON:  Radiographs 11/20/2024     FINDINGS:  Menisci:  There is a bucket-handle tear of the medial meniscus with displaced flap beneath the PCL.  Lateral meniscus is intact.     Ligaments:  ACL, PCL, MCL, and LCL complex are intact.     Tendons:  Extensor mechanism is maintained.     Cartilage:     Patellofemoral: Articular cartilage is maintained.     Medial tibiofemoral: Articular cartilage is maintained.     Lateral tibiofemoral: Articular cartilage is maintained.     Bone: No fracture or marrow replacing process.     Miscellaneous: Small joint effusion with tiny Baker's cyst.     Impression:     1. Displaced bucket-handle tear of the medial meniscus.        Electronically signed by:Reddy Vergara MD  Date:                                            11/21/2024  Time:                                           16:26      Procedures        Assessment:       Dennis Leos Jr. is a 39 y.o. male seen in the office today. The primary encounter diagnosis was Acute medial meniscus tear of left knee, initial encounter. A diagnosis of Preop examination was also pertinent to this visit.  Operative treatment with left knee surgery  is recommended at this time.  This would be in the form of arthroscopic assisted medial mensicus tear vs debridement if not repairable.  The patient had history of this knee problem starting many months ago but recently had an exacerbation with an injury.  Explained plan to attempt fixation of meniscus tear is preferred but would perform meniscectomy if not repairable.  Explained  the restrictions after surgery and patient agrees to proceed.  The natural history and expected course discussed with patient. Various treatment options were discussed, including their risks and benefits. All of the patient's questions were answered.       Plan:      Tylenol 650mg TID, PRN pain.  Surgical treatment left arthroscopic meniscus repair, INSIDE OUT vs all inside.  Surgical consent for surgery obtained during office visit.  Expected date of surgery: 12/13/2024.  Patient will NOT require preoperative medical evaluation prior to surgery. Preoperative labs/chest xray ordered.  Post operative physical therapy ordered.         Perez Sampson IV, MD   of Clinical Orthopedics  Department of Orthopedic Surgery  [facility]  Office: [phone]  Website: [email or web address]    ---------------------------------------  Orders Placed This Encounter   Procedures    X-Ray Chest 1 View Pre-OP    CBC Auto Differential    Basic Metabolic Panel    Protime-INR    APTT    Ambulatory referral/consult to Physical/Occupational Therapy

## 2024-12-05 NOTE — PROGRESS NOTES
[facility], Orthopedics and Sports Medicine  [facility]    Established Patient Knee Office Visit  12/05/2024     Diagnosis:  Left medial meniscus tear, bucket handle     Subjective:      Dennis Leos Jr. is a 39 y.o. male who returns for evaluation and treatment of the left knee.    The patient has the following symptoms: pain located medial left knee . The symptoms are not improving.  The symptoms also include catching, locking, mechanical block to motion.    Sent for MRI to elucidate knee pain cause.      Outside reports reviewed: historical medical records, office notes, and radiology reports.    History reviewed. No pertinent past medical history.    There is no problem list on file for this patient.      Past Surgical History:   Procedure Laterality Date    APPENDECTOMY      CYST REMOVAL          Current Outpatient Medications   Medication Instructions    diclofenac sodium (VOLTAREN) 2 g, Topical (Top), 3 times daily    ibuprofen (ADVIL,MOTRIN) 200 mg, Every 6 hours PRN        Review of patient's allergies indicates:  No Known Allergies    Social History     Socioeconomic History    Marital status: Unknown   Tobacco Use    Smoking status: Never    Smokeless tobacco: Never   Substance and Sexual Activity    Alcohol use: No    Drug use: No    Sexual activity: Yes     Partners: Female   Social History Narrative    ** Merged History Encounter **            Family History   Problem Relation Name Age of Onset    No Known Problems Mother      Diabetes type II Father           Review of Systems   Constitutional: Negative for chills and fever.   HENT:  Negative for hearing loss.    Eyes:  Negative for blurred vision.   Cardiovascular:  Negative for chest pain.   Respiratory:  Negative for shortness of breath.    Gastrointestinal:  Negative for abdominal pain.   Neurological:  Negative for light-headedness.          Objective:      General    Constitutional: He is oriented to person, place, and  time. He appears well-developed and well-nourished.   HENT:   Head: Normocephalic and atraumatic.   Eyes: EOM are normal.   Cardiovascular:  Normal rate.            Pulmonary/Chest: Effort normal.   Neurological: He is alert and oriented to person, place, and time.   Psychiatric: He has a normal mood and affect.     General Musculoskeletal Exam   Gait: abnormal       Right Knee Exam     Inspection   Erythema: absent  Swelling: absent  Effusion: absent    Tenderness   The patient is experiencing no tenderness.     Range of Motion   Extension:  0   Flexion:  130     Tests   Meniscus   Wilber:  Medial - negative Lateral - negative  Ligament Examination   Lachman: normal (-1 to 2mm)   PCL-Posterior Drawer: normal (0 to 2mm)     MCL - Valgus: normal (0 to 2mm)  LCL - Varus: normal  Patella   Patellar apprehension: negative    Other   Sensation: normal    Left Knee Exam     Inspection   Erythema: absent  Swelling: absent  Effusion: present    Tenderness   The patient tender to palpation of the medial joint line.    Range of Motion   Extension:  0   Flexion:  110     Tests   Meniscus   Wilber:  Medial - positive Lateral - negative  Stability   Lachman: normal (-1 to 2mm)   PCL-Posterior Drawer: normal (0 to 2mm)  MCL - Valgus: normal (0 to 2mm)  LCL - Varus: normal (0 to 2mm)  Patella   Patellar apprehension: negative    Other   Sensation: normal    Muscle Strength   Right Lower Extremity   Quadriceps:  5/5   Hamstrin/5   Left Lower Extremity   Quadriceps:  5/5   Hamstrin/5     Vascular Exam     Right Pulses    Posterior Tibial:      2+        Left Pulses    Posterior Tibial:      2+          Imaging:  MRI of the left knee taken taken    was personally reviewed from the [facility] E[facility].  Multiple T1 and T2 sequences including axial, coronal, and sagittal views were reviewed.  No acute fractures or dislocations are noted in these images.  Agree with the report below:   Details    Reading Physician  Reading Date Result Priority   Reddy Vergara MD  [phone] 11/21/2024 ASAP     Narrative & Impression  EXAMINATION:  MRI KNEE WITHOUT CONTRAST LEFT     CLINICAL HISTORY:  Knee trauma, internal derangement suspected, xray done;Other tear of medial meniscus, current injury, left knee, initial encounter     TECHNIQUE:  Multiplanar, multisequence MRI of the left knee performed per routine protocol without contrast.     COMPARISON:  Radiographs 11/20/2024     FINDINGS:  Menisci:  There is a bucket-handle tear of the medial meniscus with displaced flap beneath the PCL.  Lateral meniscus is intact.     Ligaments:  ACL, PCL, MCL, and LCL complex are intact.     Tendons:  Extensor mechanism is maintained.     Cartilage:     Patellofemoral: Articular cartilage is maintained.     Medial tibiofemoral: Articular cartilage is maintained.     Lateral tibiofemoral: Articular cartilage is maintained.     Bone: No fracture or marrow replacing process.     Miscellaneous: Small joint effusion with tiny Baker's cyst.     Impression:     1. Displaced bucket-handle tear of the medial meniscus.        Electronically signed by:Reddy Vergara MD  Date:                                            11/21/2024  Time:                                           16:26      Procedures        Assessment:       Dennis Leos Jr. is a 39 y.o. male seen in the office today. The primary encounter diagnosis was Acute medial meniscus tear of left knee, initial encounter. A diagnosis of Preop examination was also pertinent to this visit.  Operative treatment with left knee surgery  is recommended at this time.  This would be in the form of arthroscopic assisted medial mensicus tear vs debridement if not repairable.  The patient had history of this knee problem starting many months ago but recently had an exacerbation with an injury.  Explained plan to attempt fixation of meniscus tear is preferred but would perform meniscectomy if not repairable.  Explained  the restrictions after surgery and patient agrees to proceed.  The natural history and expected course discussed with patient. Various treatment options were discussed, including their risks and benefits. All of the patient's questions were answered.       Plan:      Tylenol 650mg TID, PRN pain.  Surgical treatment left arthroscopic meniscus repair, INSIDE OUT vs all inside.  Surgical consent for surgery obtained during office visit.  Expected date of surgery: 12/13/2024.  Patient will NOT require preoperative medical evaluation prior to surgery. Preoperative labs/chest xray ordered.  Post operative physical therapy ordered.         Perez Sampson IV, MD   of Clinical Orthopedics  Department of Orthopedic Surgery  [facility]  Office: [phone]  Website: [email or web address]    ---------------------------------------  Orders Placed This Encounter   Procedures    X-Ray Chest 1 View Pre-OP    CBC Auto Differential    Basic Metabolic Panel    Protime-INR    APTT    Ambulatory referral/consult to Physical/Occupational Therapy

## 2024-12-12 ENCOUNTER — PATIENT MESSAGE (OUTPATIENT)
Dept: ORTHOPEDICS | Facility: CLINIC | Age: 39
End: 2024-12-12
Payer: COMMERCIAL

## 2024-12-12 ENCOUNTER — ANESTHESIA EVENT (OUTPATIENT)
Dept: SURGERY | Facility: HOSPITAL | Age: 39
End: 2024-12-12
Payer: COMMERCIAL

## 2024-12-13 ENCOUNTER — ANESTHESIA (OUTPATIENT)
Dept: SURGERY | Facility: HOSPITAL | Age: 39
End: 2024-12-13
Payer: COMMERCIAL

## 2024-12-13 ENCOUNTER — HOSPITAL ENCOUNTER (OUTPATIENT)
Facility: HOSPITAL | Age: 39
Discharge: HOME | End: 2024-12-13
Attending: ORTHOPAEDIC SURGERY | Admitting: ORTHOPAEDIC SURGERY
Payer: COMMERCIAL

## 2024-12-13 VITALS
SYSTOLIC BLOOD PRESSURE: 149 MMHG | BODY MASS INDEX: 27.08 KG/M2 | DIASTOLIC BLOOD PRESSURE: 89 MMHG | RESPIRATION RATE: 20 BRPM | HEART RATE: 63 BPM | WEIGHT: 199.94 LBS | HEIGHT: 72 IN | OXYGEN SATURATION: 98 % | TEMPERATURE: 98 F

## 2024-12-13 DIAGNOSIS — S83.242A ACUTE MEDIAL MENISCUS TEAR OF LEFT KNEE, INITIAL ENCOUNTER: ICD-10-CM

## 2024-12-13 DIAGNOSIS — S83.242A ACUTE MEDIAL MENISCUS TEAR, LEFT, INITIAL ENCOUNTER: Primary | ICD-10-CM

## 2024-12-13 DIAGNOSIS — M25.562 LEFT KNEE PAIN, UNSPECIFIED CHRONICITY: Primary | ICD-10-CM

## 2024-12-13 DIAGNOSIS — M65.962 SYNOVITIS OF LEFT KNEE: ICD-10-CM

## 2024-12-13 PROCEDURE — 25000003 PHARM REV CODE 250: Performed by: STUDENT IN AN ORGANIZED HEALTH CARE EDUCATION/TRAINING PROGRAM

## 2024-12-13 PROCEDURE — 71000016 HC POSTOP RECOV ADDL HR: Performed by: ORTHOPAEDIC SURGERY

## 2024-12-13 PROCEDURE — 97116 GAIT TRAINING THERAPY: CPT

## 2024-12-13 PROCEDURE — 63600175 PHARM REV CODE 636 W HCPCS: Performed by: STUDENT IN AN ORGANIZED HEALTH CARE EDUCATION/TRAINING PROGRAM

## 2024-12-13 PROCEDURE — 71000015 HC POSTOP RECOV 1ST HR: Performed by: ORTHOPAEDIC SURGERY

## 2024-12-13 PROCEDURE — 63600175 PHARM REV CODE 636 W HCPCS: Performed by: ORTHOPAEDIC SURGERY

## 2024-12-13 PROCEDURE — 97161 PT EVAL LOW COMPLEX 20 MIN: CPT

## 2024-12-13 PROCEDURE — 29881 ARTHRS KNE SRG MNISECTMY M/L: CPT | Mod: LT,,, | Performed by: ORTHOPAEDIC SURGERY

## 2024-12-13 PROCEDURE — 37000009 HC ANESTHESIA EA ADD 15 MINS: Performed by: ORTHOPAEDIC SURGERY

## 2024-12-13 PROCEDURE — 64447 NJX AA&/STRD FEMORAL NRV IMG: CPT | Performed by: STUDENT IN AN ORGANIZED HEALTH CARE EDUCATION/TRAINING PROGRAM

## 2024-12-13 PROCEDURE — 25000003 PHARM REV CODE 250: Performed by: ORTHOPAEDIC SURGERY

## 2024-12-13 PROCEDURE — 37000008 HC ANESTHESIA 1ST 15 MINUTES: Performed by: ORTHOPAEDIC SURGERY

## 2024-12-13 PROCEDURE — 36000710: Performed by: ORTHOPAEDIC SURGERY

## 2024-12-13 PROCEDURE — 27201423 OPTIME MED/SURG SUP & DEVICES STERILE SUPPLY: Performed by: ORTHOPAEDIC SURGERY

## 2024-12-13 PROCEDURE — 71000033 HC RECOVERY, INTIAL HOUR: Performed by: ORTHOPAEDIC SURGERY

## 2024-12-13 PROCEDURE — 36000711: Performed by: ORTHOPAEDIC SURGERY

## 2024-12-13 RX ORDER — DEXAMETHASONE SODIUM PHOSPHATE 4 MG/ML
INJECTION, SOLUTION INTRA-ARTICULAR; INTRALESIONAL; INTRAMUSCULAR; INTRAVENOUS; SOFT TISSUE
Status: DISCONTINUED | OUTPATIENT
Start: 2024-12-13 | End: 2024-12-13

## 2024-12-13 RX ORDER — OXYCODONE HYDROCHLORIDE 5 MG/1
5 TABLET ORAL
Status: DISCONTINUED | OUTPATIENT
Start: 2024-12-13 | End: 2024-12-13

## 2024-12-13 RX ORDER — PHENYLEPHRINE HCL IN 0.9% NACL 1 MG/10 ML
SYRINGE (ML) INTRAVENOUS
Status: DISCONTINUED | OUTPATIENT
Start: 2024-12-13 | End: 2024-12-13

## 2024-12-13 RX ORDER — GABAPENTIN 300 MG/1
300 CAPSULE ORAL NIGHTLY
Qty: 14 CAPSULE | Refills: 0 | Status: SHIPPED | OUTPATIENT
Start: 2024-12-13 | End: 2024-12-27

## 2024-12-13 RX ORDER — PREGABALIN 75 MG/1
300 CAPSULE ORAL
Status: COMPLETED | OUTPATIENT
Start: 2024-12-13 | End: 2024-12-13

## 2024-12-13 RX ORDER — PROCHLORPERAZINE EDISYLATE 5 MG/ML
5 INJECTION INTRAMUSCULAR; INTRAVENOUS EVERY 30 MIN PRN
Status: DISCONTINUED | OUTPATIENT
Start: 2024-12-13 | End: 2024-12-13 | Stop reason: HOSPADM

## 2024-12-13 RX ORDER — GLUCAGON 1 MG
1 KIT INJECTION
Status: DISCONTINUED | OUTPATIENT
Start: 2024-12-13 | End: 2024-12-13 | Stop reason: HOSPADM

## 2024-12-13 RX ORDER — HYDROMORPHONE HYDROCHLORIDE 2 MG/ML
0.5 INJECTION, SOLUTION INTRAMUSCULAR; INTRAVENOUS; SUBCUTANEOUS EVERY 5 MIN PRN
Status: DISCONTINUED | OUTPATIENT
Start: 2024-12-13 | End: 2024-12-13 | Stop reason: HOSPADM

## 2024-12-13 RX ORDER — PROCHLORPERAZINE EDISYLATE 5 MG/ML
5 INJECTION INTRAMUSCULAR; INTRAVENOUS EVERY 30 MIN PRN
Status: DISCONTINUED | OUTPATIENT
Start: 2024-12-13 | End: 2024-12-13

## 2024-12-13 RX ORDER — ASPIRIN 81 MG/1
81 TABLET ORAL DAILY
Qty: 30 TABLET | Refills: 0 | Status: SHIPPED | OUTPATIENT
Start: 2024-12-13 | End: 2025-01-12

## 2024-12-13 RX ORDER — SODIUM CHLORIDE 0.9 % (FLUSH) 0.9 %
10 SYRINGE (ML) INJECTION
Status: DISCONTINUED | OUTPATIENT
Start: 2024-12-13 | End: 2024-12-13 | Stop reason: HOSPADM

## 2024-12-13 RX ORDER — ONDANSETRON HYDROCHLORIDE 2 MG/ML
4 INJECTION, SOLUTION INTRAVENOUS DAILY PRN
Status: DISCONTINUED | OUTPATIENT
Start: 2024-12-13 | End: 2024-12-13 | Stop reason: HOSPADM

## 2024-12-13 RX ORDER — TRANEXAMIC ACID 100 MG/ML
INJECTION, SOLUTION INTRAVENOUS
Status: DISCONTINUED | OUTPATIENT
Start: 2024-12-13 | End: 2024-12-13

## 2024-12-13 RX ORDER — CEFAZOLIN 2 G/1
2 INJECTION, POWDER, FOR SOLUTION INTRAMUSCULAR; INTRAVENOUS
Status: COMPLETED | OUTPATIENT
Start: 2024-12-13 | End: 2024-12-13

## 2024-12-13 RX ORDER — OXYCODONE HYDROCHLORIDE 5 MG/1
5 TABLET ORAL EVERY 6 HOURS PRN
Qty: 28 TABLET | Refills: 0 | Status: SHIPPED | OUTPATIENT
Start: 2024-12-13 | End: 2024-12-13 | Stop reason: HOSPADM

## 2024-12-13 RX ORDER — ACETAMINOPHEN 500 MG
1000 TABLET ORAL
Status: COMPLETED | OUTPATIENT
Start: 2024-12-13 | End: 2024-12-13

## 2024-12-13 RX ORDER — ACETAMINOPHEN 500 MG
1000 TABLET ORAL 3 TIMES DAILY
Qty: 180 TABLET | Refills: 0 | Status: SHIPPED | OUTPATIENT
Start: 2024-12-13 | End: 2025-01-12

## 2024-12-13 RX ORDER — MIDAZOLAM HYDROCHLORIDE 1 MG/ML
INJECTION INTRAMUSCULAR; INTRAVENOUS
Status: DISCONTINUED | OUTPATIENT
Start: 2024-12-13 | End: 2024-12-13

## 2024-12-13 RX ORDER — CELECOXIB 200 MG/1
200 CAPSULE ORAL 2 TIMES DAILY
Qty: 28 CAPSULE | Refills: 0 | Status: SHIPPED | OUTPATIENT
Start: 2024-12-13 | End: 2024-12-27

## 2024-12-13 RX ORDER — LIDOCAINE HYDROCHLORIDE 20 MG/ML
INJECTION, SOLUTION EPIDURAL; INFILTRATION; INTRACAUDAL; PERINEURAL
Status: DISCONTINUED | OUTPATIENT
Start: 2024-12-13 | End: 2024-12-13

## 2024-12-13 RX ORDER — FENTANYL CITRATE 50 UG/ML
INJECTION, SOLUTION INTRAMUSCULAR; INTRAVENOUS
Status: DISCONTINUED | OUTPATIENT
Start: 2024-12-13 | End: 2024-12-13

## 2024-12-13 RX ORDER — TRANEXAMIC ACID 10 MG/ML
1000 INJECTION, SOLUTION INTRAVENOUS
Status: DISCONTINUED | OUTPATIENT
Start: 2024-12-13 | End: 2024-12-13 | Stop reason: HOSPADM

## 2024-12-13 RX ORDER — EPINEPHRINE 1 MG/ML
INJECTION, SOLUTION, CONCENTRATE INTRAVENOUS
Status: DISCONTINUED | OUTPATIENT
Start: 2024-12-13 | End: 2024-12-13 | Stop reason: HOSPADM

## 2024-12-13 RX ORDER — ROPIVACAINE HYDROCHLORIDE 5 MG/ML
INJECTION, SOLUTION EPIDURAL; INFILTRATION; PERINEURAL
Status: COMPLETED | OUTPATIENT
Start: 2024-12-13 | End: 2024-12-13

## 2024-12-13 RX ORDER — CELECOXIB 100 MG/1
200 CAPSULE ORAL
Status: COMPLETED | OUTPATIENT
Start: 2024-12-13 | End: 2024-12-13

## 2024-12-13 RX ORDER — PROPOFOL 10 MG/ML
VIAL (ML) INTRAVENOUS
Status: DISCONTINUED | OUTPATIENT
Start: 2024-12-13 | End: 2024-12-13

## 2024-12-13 RX ORDER — SODIUM CHLORIDE 9 MG/ML
INJECTION, SOLUTION INTRAVENOUS CONTINUOUS
Status: DISCONTINUED | OUTPATIENT
Start: 2024-12-13 | End: 2024-12-13 | Stop reason: HOSPADM

## 2024-12-13 RX ORDER — OXYCODONE HYDROCHLORIDE 5 MG/1
5 TABLET ORAL
Status: DISCONTINUED | OUTPATIENT
Start: 2024-12-13 | End: 2024-12-13 | Stop reason: HOSPADM

## 2024-12-13 RX ORDER — ONDANSETRON HYDROCHLORIDE 2 MG/ML
INJECTION, SOLUTION INTRAVENOUS
Status: DISCONTINUED | OUTPATIENT
Start: 2024-12-13 | End: 2024-12-13

## 2024-12-13 RX ORDER — HYDROMORPHONE HYDROCHLORIDE 2 MG/ML
0.5 INJECTION, SOLUTION INTRAMUSCULAR; INTRAVENOUS; SUBCUTANEOUS EVERY 5 MIN PRN
Status: DISCONTINUED | OUTPATIENT
Start: 2024-12-13 | End: 2024-12-13

## 2024-12-13 RX ADMIN — CEFAZOLIN 2 G: 330 INJECTION, POWDER, FOR SOLUTION INTRAMUSCULAR; INTRAVENOUS at 10:12

## 2024-12-13 RX ADMIN — TRANEXAMIC ACID 1000 MG: 1 INJECTION, SOLUTION INTRAVENOUS at 10:12

## 2024-12-13 RX ADMIN — FENTANYL CITRATE 25 MCG: 50 INJECTION INTRAMUSCULAR; INTRAVENOUS at 10:12

## 2024-12-13 RX ADMIN — Medication 100 MCG: at 10:12

## 2024-12-13 RX ADMIN — MIDAZOLAM HYDROCHLORIDE 2 MG: 1 INJECTION, SOLUTION INTRAMUSCULAR; INTRAVENOUS at 08:12

## 2024-12-13 RX ADMIN — SODIUM CHLORIDE, SODIUM LACTATE, POTASSIUM CHLORIDE, AND CALCIUM CHLORIDE: .6; .31; .03; .02 INJECTION, SOLUTION INTRAVENOUS at 09:12

## 2024-12-13 RX ADMIN — Medication 100 MCG: at 11:12

## 2024-12-13 RX ADMIN — LIDOCAINE HYDROCHLORIDE 100 MG: 20 INJECTION, SOLUTION EPIDURAL; INFILTRATION; INTRACAUDAL; PERINEURAL at 09:12

## 2024-12-13 RX ADMIN — ACETAMINOPHEN 1000 MG: 500 TABLET ORAL at 08:12

## 2024-12-13 RX ADMIN — ONDANSETRON 4 MG: 2 INJECTION INTRAMUSCULAR; INTRAVENOUS at 11:12

## 2024-12-13 RX ADMIN — Medication 150 MCG: at 10:12

## 2024-12-13 RX ADMIN — PROPOFOL 200 MG: 10 INJECTION, EMULSION INTRAVENOUS at 09:12

## 2024-12-13 RX ADMIN — ROPIVACAINE HYDROCHLORIDE 25 ML: 5 INJECTION, SOLUTION EPIDURAL; INFILTRATION; PERINEURAL at 08:12

## 2024-12-13 RX ADMIN — DEXAMETHASONE SODIUM PHOSPHATE 4 MG: 4 INJECTION, SOLUTION INTRA-ARTICULAR; INTRALESIONAL; INTRAMUSCULAR; INTRAVENOUS; SOFT TISSUE at 10:12

## 2024-12-13 RX ADMIN — CELECOXIB 200 MG: 100 CAPSULE ORAL at 08:12

## 2024-12-13 RX ADMIN — PREGABALIN 300 MG: 75 CAPSULE ORAL at 08:12

## 2024-12-13 RX ADMIN — OXYCODONE 5 MG: 5 TABLET ORAL at 11:12

## 2024-12-13 NOTE — DISCHARGE INSTRUCTIONS
Time Line After Knee Scope    Day 1-2 after surgery   Out of bed as much as possible  Keep your operative leg elevated with ICE  Take your pain medication as soon as you start feeling pain  Take your Aspirin for DVT prophylaxis as prescribed  You can put weight on your leg as tolerated.  Use crutches for 2-3 days then discontinue when you have full strength after nerve block has completely worn off and leg feels normal sensation    Day 3 after surgery  Change your dressing by taking off the surgical dressing and covering the wounds with a waterproof bandage.  You can shower with the wounds covered but you must keep the wounds dry.     Day 10-14   Follow up with surgeon for suture removal    DIET  Begin with clear liquids and light foods (jellos, soups, etc.)  Progress to your normal diet if you are not nauseated    WOUND CARE  To avoid infection, keep surgical incisions clean and dry -- NO immersion of operative leg i.e.bath    MEDICATIONS  Pain medication is injected into the knee wounds during surgery - this will wear off within 8-12 hours  Most patients will require some narcotic pain medication for a short period of time - this can be taken as per the directions on the bottle  Common side effects of the pain medication are nausea, drowsiness, and constipation - to decrease the side effects, take medication with food - if constipation occurs, consider taking an over-the-counter laxative  If you are having problems with nausea and vomiting, contact the office to possibly have your medication changed   Do not drive a car or operate machinery while taking the narcotic medication  Ibuprofen 200-400mg (i.e. Advil) may be taken in between the narcotic pain medication to help smooth out the post-operative peaks and valleys, reduce overall amount of pain medication required, and increase the time intervals between narcotic pain medication usage    ACTIVITY  Elevate the operative leg to chest level whenever possible to  decrease swelling  Use crutches to assist with walking for 2-3 days then discontinue   Do not engage in activities which increase leg pain/swelling (prolonged periods of standing or walking) over the first 7-10 days following surgery  Avoid long periods of sitting (without leg elevated) or long distance traveling for 2 weeks  NO driving until instructed otherwise by physician  May return to sedentary work ONLY or school 3-4 days after surgery, if pain is tolerable    ICE THERAPY  Begin immediately after surgery  Use ice pack 2 hours for 20 minutes daily until your first post-operative visit - remember to keep a towel on the skin to avoid freezing the skin with ice    EXERCISE  No exercise or motion is to be done until instructed to do so by your physician after the first postoperative visit  Formal physical therapy (PT) will begin after surgery   Begin Dr. Sampson Knee Exercises (below) on day 2 after surgery.     EMERGENCIES  Contact Dr. Sampson if any of the following are present:   Painful swelling or numbness   Unrelenting pain   Fever (over 101° - it is normal to have a low grade fever for the first day or two following surgery) or chills   Redness around incisions   Color change in wrist or hand   Continuous drainage or bleeding from incision (a small amount of drainage is expected)   Difficulty breathing   Excessive nausea/vomiting  **If you have an emergency after office hours or on the weekend, contact the same office number and you will be connected to our page service   **If you have an emergency that requires immediate attention, proceed to the nearest emergency room.    Dr. Sampson Knee Exercises   **Perform these exercises 3 times a day starting day 1 or 2 after surgery**    1. Ankle pumps: With your leg straight, bend your ankle up (toes  pointing straight up) and down (toes pointing straight out ahead of  you). Do 10 repetitions. Also, spell out the alphabet (A, B, C, D, etc)  forward and backward  using your big toe as the pen or pencil.      2. Straight leg raises: With your leg straight, lift up your leg off the  bed about 2 feet (24 inches), then slowly bring the straight leg back  down to the bed. You should use your front thigh muscles (quad  muscles) to raise the leg.      3. Quad sets: With your leg straight out and your foot and ankle  resting on a rolled towel, tighten the front of your thigh (quad  muscles) and try to push the back of your knee flat down towards  the bed. Hold the leg in this position for 10 seconds, then relax.      4. Gluteal squeezes: While laying flat on your back, squeeze your butt  muscles (gluteals) together and hold together for 10 seconds, then  relax.      5. Heel slides: While laying flat on your back and your leg straight,  begin to slide your heel backward towards your butt. Stop sliding  towards your butt once you reach you reach approximately 90  degrees or once your knee becomes a little uncomfortable and you  feel pressure inside your knee. Hold your leg in this position for 10  seconds. Then slowly let your heel slide back to a straight leg  Position.      6. Patellar mobilizations: With your leg straight out, use both hands to  move your kneecap in four different directions. First, push the  kneecap left and hold it for 10 seconds. Next, push the kneecap  right and hold it for 10 seconds. Then, push the kneecap towards  your toes and hold it for 10 seconds. Finally, pull the knee up  towards your hip and hold it for 10 seconds. The total distance  moved for each direction should be one inch or less.      7. Prone hangs: While laying flat on your stomach and your legs  hanging off the edge of the bed, position the leg so that the  kneecap is comfortably just off the edge of the bed. Simply relax  your body and your legs so that your knee straightens out and  stretches the back of your knee. Hold in this position for 5 minutes  or as long as tolerated if less than 5  minutes.      Pain Medications  -You were given a nerve block in conjunction with your surgical procedure.  This nerve block will wear off after surgery.  When you begin feeling pain it is important for you to take your pain medication as directed.    You are prescribed the following medications; it is important to take the medications as prescribed.   -Acetaminophen 1000mg every 8 hours.  Start taking this medication as soon as you get home even if you don't have pain, then continue taking as directed.  -Celebrex 200mg twice daily.  Take your first dose as soon as you get home, then continue taking as directed.   -Gabapentin 300mg before bedtime.  Take your first dose the evening of surgery.  -Oxycodone 5mg every 6 hours AS NEEDED ONLY.  Take your first dose when you begin feeling knee pain when your nerve block wears off.  After this first dose, only take this medication if your pain is extreme.  We will not refill this medication after surgery because it is a Narcotic Pain medication.    -Aspirin 81mg daily.  Take this medication the night of surgery, then continue taking daily for 30 days to reduce risk of blood clot after surgery.     PLEASE REFER TO DRUG INFO SHEETS FROM PHARMACY WITH EACH NEW PRESCRIPTION MEDICATION      ANESTHESIA  -For the first 24 hours after surgery:  Do not drive, use heavy equipment, make important decisions, or drink alcohol  -It is normal to feel sleepy for several hours.  Rest until you are more awake.  -Have someone stay with you, if needed.  They can watch for problems and help keep you safe.  -Some possible post anesthesia side effects include: nausea and vomiting, sore throat and hoarseness, sleepiness, and dizziness.    PAIN  -If you have pain after surgery, pain medicine will help you feel better.  Take it as directed, before pain becomes severe.  Most pain relievers taken by mouth need at least 20-30 minutes to start working.  -Do not drive or drink alcohol while taking pain  medicine.  -Pain medication can upset your stomach.  Taking them with a little food may help.  -Other ways to help control pain: elevation, ice, and relaxation  -Call your surgeon if still having unmanageable pain an hour after taking pain medicine.  -Pain medicine can cause constipation.  Taking an over-the counter stool softener while on prescription pain medicine and drinking plenty of fluids can prevent this side effect.  -Call your surgeon if you have severe side effects like: breathing problems, trouble waking up, dizziness, confusion, or severe constipation.    NAUSEA  -Some people have nausea after surgery.  This is often because of anesthesia, pain, pain medicine, or the stress of surgery.  -Do not push yourself to eat.  Start off with clear liquids and soup.  Slowly move to solid foods.  Don't eat fatty, rich, spicy foods at first.  Eat smaller amounts.  -If you develop persistent nausea and vomiting please notify your surgeon immediately.    BLEEDING  -Different types of surgery require different types of care and dressing changes.  It is important to follow all instructions and advice from your surgeon.  Change dressing as directed.  Call your surgeon for any concerns regarding postop bleeding.    SIGNS OF INFECTION  -Signs of infection include: fever, swelling, drainage, and redness  -Notify your surgeon if you have a fever of 100.4 F (38.0 C) or higher.  -Notify your surgeon if you notice redness, swelling, increased pain, pus, or a foul smell at the incision site.

## 2024-12-13 NOTE — INTERVAL H&P NOTE
The patient has been examined and the H&P has been reviewed:    I concur with the findings and no changes have occurred since H&P was written.    Surgery risks, benefits and alternative options discussed and understood by patient/family.      There are no hospital problems to display for this patient.    ,Perez Sampson IV

## 2024-12-13 NOTE — DISCHARGE SUMMARY
[facility] - [facility] (Hospital)  Discharge Note  Short Stay    Procedure(s) (LRB):  REPAIR, MENISCUS, KNEE Left, INSIDE OUT BUCKET HANDLE (Left)      OUTCOME: Patient tolerated treatment/procedure well without complication and is now ready for discharge.    DISPOSITION: Home or Self Care    FINAL DIAGNOSIS:  Acute medial meniscus tear, left, initial encounter    FOLLOWUP: In clinic    DISCHARGE INSTRUCTIONS:  No discharge procedures on file.     TIME SPENT ON DISCHARGE: 10 minutes

## 2024-12-13 NOTE — ANESTHESIA POSTPROCEDURE EVALUATION
Anesthesia Post Evaluation    Patient: Dennis Leos Jr.    Procedure(s) Performed: Procedure(s) (LRB):  ARTHROSCOPY, KNEE, WITH PARTIAL MENISCECTOMY (Left)  SYNOVECTOMY, KNEE, LIMITED, ARTHROSCOPIC (Left)    Final Anesthesia Type: general      Patient location during evaluation: PACU  Patient participation: Yes- Able to Participate  Level of consciousness: awake and alert  Post-procedure vital signs: reviewed and stable  Pain management: adequate  Airway patency: patent    PONV status at discharge: No PONV  Anesthetic complications: no      Cardiovascular status: stable  Respiratory status: room air  Hydration status: euvolemic  Follow-up not needed.              Vitals Value Taken Time   /76 12/13/24 1156   Temp 36.9 °C (98.5 °F) 12/13/24 1130   Pulse 72 12/13/24 1159   Resp 13 12/13/24 1159   SpO2 99 % 12/13/24 1159   Vitals shown include unfiled device data.      Event Time   Out of Recovery 11:57:22         Pain/Brooks Score: Pain Rating Prior to Med Admin: 5 (12/13/2024 11:39 AM)  Brooks Score: 10 (12/13/2024 11:55 AM)

## 2024-12-13 NOTE — ANESTHESIA PROCEDURE NOTES
Peripheral Block    Patient location during procedure: pre-op   Block not for primary anesthetic.  Reason for block: at surgeon's request and post-op pain management   Post-op Pain Location: left knee   Start time: 12/13/2024 8:38 AM  Timeout: 12/13/2024 8:35 AM   End time: 12/13/2024 8:40 AM    Staffing  Authorizing Provider: Krzysztof Macias MD  Performing Provider: Krzysztof Macias MD    Staffing  Performed by: Krzysztof Macias MD  Authorized by: Krzysztof Macias MD    Preanesthetic Checklist  Completed: patient identified, IV checked, site marked, risks and benefits discussed, surgical consent, monitors and equipment checked, pre-op evaluation and timeout performed  Peripheral Block  Patient position: supine  Prep: ChloraPrep  Patient monitoring: heart rate, cardiac monitor, continuous pulse ox, continuous capnometry and frequent blood pressure checks  Block type: femoral  Laterality: left  Injection technique: single shot  Needle  Needle type: Stimuplex   Needle gauge: 21 G  Needle length: 4 in  Needle localization: anatomical landmarks and ultrasound guidance   -ultrasound image captured on disc.  Assessment  Injection assessment: negative aspiration, negative parasthesia and local visualized surrounding nerve  Paresthesia pain: none  Heart rate change: no  Slow fractionated injection: yes  Pain Tolerance: comfortable throughout block and no complaints  Medications:    Medications: ropivacaine (NAROPIN) injection 0.5% - Perineural   25 mL - 12/13/2024 8:40:00 AM    Additional Notes  VSS.  [facility] RN monitoring vitals throughout procedure.  Patient tolerated procedure well.

## 2024-12-13 NOTE — PLAN OF CARE
Pt meets all OPS discharge criteria. VSS. Pain well controlled. Able to void. Tolerating clear liquids. IV discontinued and discharge instructions given. Pt working with pt now for crutch training.

## 2024-12-13 NOTE — ANESTHESIA PROCEDURE NOTES
Intubation    Date/Time: 12/13/2024 9:59 AM    Performed by: Candelaria Coles MD  Authorized by: Krzysztof Macias MD    Intubation:     Induction:  Intravenous    Intubated:  Postinduction    Mask Ventilation:  Not attempted    Attempts:  1    Attempted By:  Resident anesthesiologist    Difficult Airway Encountered?: No      Airway Device:  Supraglottic airway/LMA    Airway Device Size:  5.0    Placement Verified By:  Capnometry    Complicating Factors:  None    Findings Post-Intubation:  BS equal bilateral and atraumatic/condition of teeth unchanged

## 2024-12-13 NOTE — ANESTHESIA PREPROCEDURE EVALUATION
12/13/2024  Dennis Leos Jr. is a 39 y.o., male for REPAIR, MENISCUS, KNEE Left, INSIDE OUT BUCKET HANDLE (Left)    History reviewed. No pertinent past medical history.  Past Surgical History:   Procedure Laterality Date    APPENDECTOMY      CYST REMOVAL       Pre-op Assessment       I have reviewed the Medications.     Review of Systems  Anesthesia Hx:             Denies Family Hx of Anesthesia complications.         Physical Exam  General: Well nourished    Airway:  Mallampati: II   TM Distance: Normal  Neck ROM: Normal ROM    Dental:  Intact    Chest/Lungs:  Clear to auscultation    Heart:  Rate: Normal  Rhythm: Regular Rhythm        Anesthesia Plan  Type of Anesthesia, risks & benefits discussed:    Anesthesia Type: Gen ETT  Intra-op Monitoring Plan: Standard ASA Monitors  Post Op Pain Control Plan: multimodal analgesia  Induction:  IV  Informed Consent: Informed consent signed with the Patient and all parties understand the risks and agree with anesthesia plan.  All questions answered.   ASA Score: 2    Ready For Surgery From Anesthesia Perspective.     .

## 2024-12-13 NOTE — TRANSFER OF CARE
Anesthesia Transfer of Care Note    Patient: Dennis Leos Jr.    Procedure(s) Performed: Procedure(s) (LRB):  ARTHROSCOPY, KNEE, WITH PARTIAL MENISCECTOMY (Left)    Patient location: PACU    Anesthesia Type: general    Transport from OR: Transported from OR on 6-10 L/min O2 by face mask with adequate spontaneous ventilation    Post pain: adequate analgesia    Post assessment: no apparent anesthetic complications    Post vital signs: stable    Level of consciousness: awake    Nausea/Vomiting: no nausea/vomiting    Complications: none    Transfer of care protocol was followed      Last vitals: Visit Vitals  /67   Pulse 83   Temp 37.2 °C (99 °F) (Skin)   Resp 16   Ht 6' (1.829 m)   Wt 90.7 kg (199 lb 15.3 oz)   SpO2 100%   BMI 27.12 kg/m²

## 2024-12-13 NOTE — PT/OT/SLP PROGRESS
Physical Therapy Crutch Evaluation/Training    Dennis Leos Jr.   MRN: [id number]   Admitting Diagnosis: Acute medial meniscus tear, left, initial encounter    PT Received On: 12/13/24  PT Start Time: 1323     PT Stop Time: 1402    PT Total Time (min): 39 min       Billable Minutes:  Evaluation 10 and Gait Training 29      Order: S/p left arthroscopic partial meniscectomy, limited synovectomy; NWBing to LLE at this time  *Per op note: The patient will be NWB on the operative extremity until there is full return of motor function s/p block, then the patient can WBAT.    Order Date: 12/23/24    Precautions Weight Bearing Status: non weight bearing: left leg    Patient Active Problem List   Diagnosis    Acute medial meniscus tear, left, initial encounter    Synovitis of left knee     History reviewed. No pertinent past medical history.  Past Surgical History:   Procedure Laterality Date    APPENDECTOMY      CYST REMOVAL         Subjective Information     Prior level of function: independent  Residence: lives with their family 1-story house/ trailer ; 4 steps to enter with BHR (too wide to reach at same time); WIS and Tub/shower  Support available: family  Equipment owned: given B axillary crutches during session  Mental Status: Oriented X 4 and Alert  Sensation: able to feel light touch to BLE; but reports lighter to LLE vs RLE    Pain at time of assessment: 0/10    Objective findings/Assessment  Bed mobility: MOD I   Transfers: SBA with use of B axillary crutches; LLE NWBing  ROM: WFL; except LLE due to HKB in place  Strength: WFL; except LLE due to post surgery weakness and HKB in place  Patient requires crutch fitting and training.    Treatment  Gait: ~30ft x3 trials with seated rest break between with use of B axillary crutches; LLE NWBing; with CGA- hop to gait on RLE  Stairs: ascend/descend x1 step with use of BUE on unilateral rail; lateral hopping with CGA; hop to pattern on RLE  Transfers: SBA with use of B  axillary crutches; LLE NWBing  Education provided in form of: LLE NWBing at this time, use of B axillary crutches with transfers and mobility; lateral hopping for stair navigation. Given and educated on use of gait belt. Pt and wife understanding of education provided.     *pt with nausea during session; reports resolution towards end of session- nursing notified (/93)    AM-PAC 6 CLICK MOBILITY  How much help from another person does this patient currently need?   1 = Unable, Total/Dependent Assistance  2 = A lot, Maximum/Moderate Assistance  3 = A little, Minimum/Contact Guard/Supervision  4 = None, Modified Kaplan/Independent    Turning over in bed (including adjusting bedclothes, sheets and blankets)?: 4  Sitting down on and standing up from a chair with arms (e.g., wheelchair, bedside commode, etc.): 3  Moving from lying on back to sitting on the side of the bed?: 4  Moving to and from a bed to a chair (including a wheelchair)?: 3  Need to walk in hospital room?: 3  Climbing 3-5 steps with a railing?: 3  Basic Mobility Total Score: 20     AM-PAC Raw Score CMS G-Code Modifier Level of Impairment Assistance   6 % Total / Unable   7 - 9 CM 80 - 100% Maximal Assist   10 - 14 CL 60 - 80% Moderate Assist   15 - 19 CK 40 - 60% Moderate Assist   20 - 22 CJ 20 - 40% Minimal Assist   23 CI 1-20% SBA / CGA   24 CH 0% Independent/ Mod I     Goals/Discharge Status  Patient safely and effectively ambulates with crutches with   CGA/SBA ,  non weight bearing: left leg on level surfaces.    Recommended Plan:  Patient to be discharged to home with family support.  Recommending low intensity therapy- OP PT when MD deems appropriate.     12/13/2024

## 2024-12-13 NOTE — OP NOTE
Operative Report    DENNIS DAVIS JR.  : 1985  MRN: [id number]    Date of Procedure: 2024    Pre-operative Diagnosis:    Left knee medial meniscus tear, bucket handle      Post-operative Diagnosis:  Post-Op Diagnosis Codes:     * Acute medial meniscus tear of left knee, initial encounter [S83.242A]   Left knee synovitis     Procedure:    Left knee medial meniscectomy (16991)  Left limited synovectomy (74171)     Surgeon: Perez Sampson IV, MD    Assistant:    Maximo Araujo MD     Anesthesia: General     EBL: None.    IVF: Per anesthesia    Findings:       Effusion: Present   Patella: Normal cartilage   Trochlea: Normal   Medial Compartment:   Medial cartilage: Grade II chondromalacia    Medial meniscus:  bucket handle tear macerated, red-white zone   Lateral Compartment:   Lateral cartilage: Grade II chondromalacia    Lateral meniscus: Normal   ACL/PCL: Normal   Other: None    Field of view:  7    Indications for surgery  Dennis Davis Jr. is a 39 y.o. male patient who was seen in the clinic complaining of pain, swelling and mechanical symptoms.  History, exam and imaging are consistent with a torn meniscus.  The risks and benefits of nonoperative versus operative treatment were discussed.  The patient was oriented as to the diagnosis and the treatment options.  The risks of surgery including, but not limited to, infection, bleeding, nerve and vessel injury, and ongoing pain. Understanding the risks, the patient elected to proceed with surgery.    Procedure in Detail  Prior to the surgical procedure, the patient was identified in the preoperative holding area.  We had a thorough discussion about the risks and benefits of surgery including the expected post operative protocol.  The patient signed an informed consent and the operative extremity was marked.  A regional anesthesia block was performed by the Anesthesiology team prior to surgery.  Preoperative antibiotics were given prior to incision for infection  prophylaxis. The patient was taken to the operating room and positioned on the table in the supine position.  After general anesthesia was induced, a well padded thigh tourniquet was placed.  An exam was performed, which showed the knee was stable to ligamentous examination.  The patient was subsequently prepped and draped in the usual orthopaedic sterile fashion.  A surgical timeout was performed confirming the surgical site and procedure prior to proceeding.  Local anesthetic was injected into the proposed portals.     A standard anterolateral portal was made, and the arthroscope was introduced.  Under direct vision with the use of a spinal needle an anteromedial portal was made, and a probe was placed in the knee.  A thorough diagnostic arthroscopy was performed, and the above listed findings were seen.      (30046) Attention was turned to the torn medial meniscus.  The meniscus was reduced using a blunt trochar.  The torn segment was macerated and poor tissue quality.  Therefore it was resected.  A series of arthroscopic biters were used to resect the torn portion of meniscus.  A motorized shaver was used to restore normal contour.  The probe demonstrated that the remaining meniscal tissue was stable.      (35140)  A limited synovectomy was performed.  This was conducted with a motorized shaver in the medial, lateral, and anterior compartments.  Care was taken to avoid injury to surrounding structures.  The probe was used to ensure remaining tissue was stable.     A thorough lavage of the knee was performed, and the arthroscopic instruments were removed.  The portals were closed with 3-0 Nylon.  A dry compressive dressing was applied.  The sponge, needle, and instrument counts were correct at the end of the case.  The patient tolerated the procedure well, was extubated, and was taken to recovery in stable condition.    Post-Operative Management:  The patient will be NWB on the operative extremity until there is  full return of motor function s/p block, then the patient can WBAT.  After discharge, the patient will follow up in my office ([phone]) in 14 days after surgery.  The patient will be treated with DVT prophylaxis in the post operative period.      Complications: No     Condition: Good     Disposition: PACU - hemodynamically stable.     Attestation: I was present and scrubbed for the entire procedure.    Implants:   * No implants in log *

## 2024-12-16 ENCOUNTER — CLINICAL SUPPORT (OUTPATIENT)
Dept: REHABILITATION | Facility: HOSPITAL | Age: 39
End: 2024-12-16
Payer: COMMERCIAL

## 2024-12-16 DIAGNOSIS — R26.9 ABNORMAL GAIT: ICD-10-CM

## 2024-12-16 DIAGNOSIS — M25.562 ACUTE PAIN OF LEFT KNEE: Primary | ICD-10-CM

## 2024-12-16 DIAGNOSIS — R53.1 WEAKNESS: ICD-10-CM

## 2024-12-16 DIAGNOSIS — S83.242A ACUTE MEDIAL MENISCUS TEAR OF LEFT KNEE, INITIAL ENCOUNTER: ICD-10-CM

## 2024-12-16 PROCEDURE — 97110 THERAPEUTIC EXERCISES: CPT | Performed by: PHYSICAL THERAPIST

## 2024-12-16 PROCEDURE — 97161 PT EVAL LOW COMPLEX 20 MIN: CPT | Performed by: PHYSICAL THERAPIST

## 2024-12-16 NOTE — PROGRESS NOTES
[facility]   Physical Therapy Initial Evaluation      Name: Dennis Leos Jr.  Clinic Number: [id number]    Therapy Diagnosis: No diagnosis found.     Physician: Perez Sampson IV, MD    Physician Orders: PT Eval and Treat   Medical Diagnosis from Referral: S83.242A (ICD-10-CM) - Acute medial meniscus tear of left knee, initial encounter   Evaluation Date: 12/16/2024  Authorization Period Expiration: 12/31/24  Plan of Care Expiration: 4/1/25  Progress Note Due: 12/31/24  Visit # / Visits authorized: 1/ 1   FOTO: 1/1    Precautions: Standard     Time In: 0900  Time Out: 1000  Total Appointment Time (timed & untimed codes): 60 minutes    Procedure:               Left knee medial meniscectomy (56961)  Left limited synovectomy (34896)       Findings:                                              Effusion: Present              Patella: Normal cartilage              Trochlea: Normal              Medial Compartment:   Medial cartilage: Grade II chondromalacia                          Medial meniscus:  bucket handle tear macerated, red-white zone              Lateral Compartment:   Lateral cartilage: Grade II chondromalacia                          Lateral meniscus: Normal         ACL/PCL: Normal              Other: None      Post-Operative Management:  The patient will be NWB on the operative extremity until there is full return of motor function s/p block, then the patient can WBAT.  After discharge, the patient will follow up in my office ([phone]) in 14 days after surgery.  The patient will be treated with DVT prophylaxis in the post operative period.      Subjective     Date of onset: 12/13/24    History of current condition - Dennis reports: doing well post surgery. States that knee is sore but improving. Has crutches but is not using them. Denies s/s of infection, DVT. 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      Medical History:   No past medical history on file.    Surgical History:   Dennis Leos Jr.  has a past surgical history that includes Appendectomy and Cyst Removal.    Medications:   Dennis has a current medication list which includes the following prescription(s): acetaminophen, aspirin, celecoxib, diclofenac sodium, gabapentin, ibuprofen, and loratadine-pseudoephedrine  mg.    Allergies:   Review of patient's allergies indicates:  No Known Allergies     Objective      Observation: presents in gym. No distress noted. No s/s of infection noted    Gait: independent, decreased knee ext in stance and flexion in swing    Range of Motion(*=pain):   Knee AROM PROM   Right 5-0-140 5-0-140   Left 0-90 1-0-90     Lower Extremity Strength  LLE quad set : good but does have some pain    Joint Mobility: discomfort noted with superior glide     Palpation: tenderness along incision site    Proximal/distal screen: full hip and knee ROM    Sensation: intact BLE      Limitation/Restriction for FOTO  Survey    Therapist reviewed FOTO scores for Dennis Leos Jr. on 12/16/2024.   FOTO documents entered into [employer] - see Media section.    Limitation Score: see media         Treatment     Total Treatment time (time-based codes) separate from Evaluation: 20 minutes      Dennis received the treatments listed below:      therapeutic exercises to develop strength and ROM for 20 minutes including:  Issued hep listed in pt instructions    Patient Education and Home Exercises     Education provided:   - reviewed hep, infection control    Written Home Exercises  Provided: yes. Exercises were reviewed and Dennis was able to demonstrate them prior to the end of the session.  Dennis demonstrated good  understanding of the education provided. See EMR under Patient Instructions for exercises provided during therapy sessions.    Assessment     Dennis is a 39 y.o. male referred to outpatient Physical Therapy with a medical diagnosis of S83.242A (ICD-10-CM) - Acute medial meniscus tear of left knee, initial encounter . Patient presents with decreased ROM, weakness, pain, abnormal gait, secondary to the above dx. Pt would benefit from skilled PT in order to maximize function.     Patient prognosis is Excellent.   Patient will benefit from skilled outpatient Physical Therapy to address the deficits stated above and in the chart below, provide patient /family education, and to maximize patientt's level of independence.     Plan of care discussed with patient: Yes  Patient's spiritual, cultural and educational needs considered and patient is agreeable to the plan of care and goals as stated below:     Anticipated Barriers for therapy: none    Medical Necessity is demonstrated by the following  History  Co-morbidities and personal factors that may impact the plan of care [x] LOW: no personal factors / co-morbidities  [] MODERATE: 1-2 personal factors / co-morbidities  [] HIGH: 3+ personal factors / co-morbidities     Moderate / High Support Documentation:   Co-morbidities affecting plan of care: NA     Personal Factors:   no deficits      Examination  Body Structures and Functions, activity limitations and participation restrictions that may impact the plan of care [x] LOW: addressing 1-2 elements  [] MODERATE: 3+ elements  [] HIGH: 4+ elements (please support below)     Moderate / High Support Documentation: NA      Clinical Presentation [x] LOW: stable  [] MODERATE: Evolving  [] HIGH: Unstable      Decision Making/ Complexity Score: low       GOALS: Short Term Goals:  6 weeks  1.Report  decreased L knee pain  < / =  0/10  to increase tolerance for exercise  2. Increase knee ROM to full in order to be able to perform ADLs without difficulty.  3. Increase strength by 1/3 MMT grade in L knee  to increase tolerance for ADL and work activities.  4. Pt to tolerate HEP to improve ROM and independence with ADL's    Long Term Goals: 12-16 weeks  1.Report decreased L knee pain < / = 0/10  to increase tolerance for exercise  2.Patient goal: return to gym without limitation  3.Increase strength to >/= 4+/5 in L knee  to increase tolerance for ADL and work activities.  4. Pt will report at 10% limitation on FOTO knee to demonstrate increase in LE functional mobility.   Plan     Plan of care Certification: 12/16/2024 to 4/1/25.    Outpatient Physical Therapy 1-3 times weekly for 16 weeks to include the following interventions: Electrical Stimulation DN, Manual Therapy, Moist Heat/ Ice, Neuromuscular Re-ed, Patient Education, Therapeutic Activities, and Therapeutic Exercise.     Jarad Cameron, PT, DPT

## 2024-12-17 PROBLEM — R26.9 ABNORMAL GAIT: Status: ACTIVE | Noted: 2024-12-17

## 2024-12-17 PROBLEM — R53.1 WEAKNESS: Status: ACTIVE | Noted: 2024-12-17

## 2024-12-17 PROBLEM — M25.562 ACUTE PAIN OF LEFT KNEE: Status: ACTIVE | Noted: 2024-12-17

## 2024-12-17 NOTE — PLAN OF CARE
[facility]   Physical Therapy Initial Evaluation      Name: Dennis Leos Jr.  Clinic Number: [id number]    Therapy Diagnosis: No diagnosis found.     Physician: Perez Sampson IV, MD    Physician Orders: PT Eval and Treat   Medical Diagnosis from Referral: S83.242A (ICD-10-CM) - Acute medial meniscus tear of left knee, initial encounter   Evaluation Date: 12/16/2024  Authorization Period Expiration: 12/31/24  Plan of Care Expiration: 4/1/25  Progress Note Due: 12/31/24  Visit # / Visits authorized: 1/ 1   FOTO: 1/1    Precautions: Standard     Time In: 0900  Time Out: 1000  Total Appointment Time (timed & untimed codes): 60 minutes    Procedure:               Left knee medial meniscectomy (05555)  Left limited synovectomy (31942)       Findings:                                              Effusion: Present              Patella: Normal cartilage              Trochlea: Normal              Medial Compartment:   Medial cartilage: Grade II chondromalacia                          Medial meniscus:  bucket handle tear macerated, red-white zone              Lateral Compartment:   Lateral cartilage: Grade II chondromalacia                          Lateral meniscus: Normal         ACL/PCL: Normal              Other: None      Post-Operative Management:  The patient will be NWB on the operative extremity until there is full return of motor function s/p block, then the patient can WBAT.  After discharge, the patient will follow up in my office ([phone]) in 14 days after surgery.  The patient will be treated with DVT prophylaxis in the post operative period.      Subjective     Date of onset: 12/13/24    History of current condition - Dennis reports: doing well post surgery. States that knee is sore but improving. Has crutches but is not using them. Denies s/s of infection, DVT. 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      Medical History:   No past medical history on file.    Surgical History:   Dennis Leos Jr.  has a past surgical history that includes Appendectomy and Cyst Removal.    Medications:   Dennis has a current medication list which includes the following prescription(s): acetaminophen, aspirin, celecoxib, diclofenac sodium, gabapentin, ibuprofen, and loratadine-pseudoephedrine  mg.    Allergies:   Review of patient's allergies indicates:  No Known Allergies     Objective      Observation: presents in gym. No distress noted. No s/s of infection noted    Gait: independent, decreased knee ext in stance and flexion in swing    Range of Motion(*=pain):   Knee AROM PROM   Right 5-0-140 5-0-140   Left 0-90 1-0-90     Lower Extremity Strength  LLE quad set : good but does have some pain    Joint Mobility: discomfort noted with superior glide     Palpation: tenderness along incision site    Proximal/distal screen: full hip and knee ROM    Sensation: intact BLE      Limitation/Restriction for FOTO  Survey    Therapist reviewed FOTO scores for Dennis Leos Jr. on 12/16/2024.   FOTO documents entered into [employer] - see Media section.    Limitation Score: see media         Treatment     Total Treatment time (time-based codes) separate from Evaluation: 20 minutes      Dennis received the treatments listed below:      therapeutic exercises to develop strength and ROM for 20 minutes including:  Issued hep listed in pt instructions    Patient Education and Home Exercises     Education provided:   - reviewed hep, infection control    Written Home Exercises  Provided: yes. Exercises were reviewed and Dennis was able to demonstrate them prior to the end of the session.  Dennis demonstrated good  understanding of the education provided. See EMR under Patient Instructions for exercises provided during therapy sessions.    Assessment     Dennis is a 39 y.o. male referred to outpatient Physical Therapy with a medical diagnosis of S83.242A (ICD-10-CM) - Acute medial meniscus tear of left knee, initial encounter . Patient presents with decreased ROM, weakness, pain, abnormal gait, secondary to the above dx. Pt would benefit from skilled PT in order to maximize function.     Patient prognosis is Excellent.   Patient will benefit from skilled outpatient Physical Therapy to address the deficits stated above and in the chart below, provide patient /family education, and to maximize patientt's level of independence.     Plan of care discussed with patient: Yes  Patient's spiritual, cultural and educational needs considered and patient is agreeable to the plan of care and goals as stated below:     Anticipated Barriers for therapy: none    Medical Necessity is demonstrated by the following  History  Co-morbidities and personal factors that may impact the plan of care [x] LOW: no personal factors / co-morbidities  [] MODERATE: 1-2 personal factors / co-morbidities  [] HIGH: 3+ personal factors / co-morbidities     Moderate / High Support Documentation:   Co-morbidities affecting plan of care: NA     Personal Factors:   no deficits      Examination  Body Structures and Functions, activity limitations and participation restrictions that may impact the plan of care [x] LOW: addressing 1-2 elements  [] MODERATE: 3+ elements  [] HIGH: 4+ elements (please support below)     Moderate / High Support Documentation: NA      Clinical Presentation [x] LOW: stable  [] MODERATE: Evolving  [] HIGH: Unstable      Decision Making/ Complexity Score: low       GOALS: Short Term Goals:  6 weeks  1.Report  decreased L knee pain  < / =  0/10  to increase tolerance for exercise  2. Increase knee ROM to full in order to be able to perform ADLs without difficulty.  3. Increase strength by 1/3 MMT grade in L knee  to increase tolerance for ADL and work activities.  4. Pt to tolerate HEP to improve ROM and independence with ADL's    Long Term Goals: 12-16 weeks  1.Report decreased L knee pain < / = 0/10  to increase tolerance for exercise  2.Patient goal: return to gym without limitation  3.Increase strength to >/= 4+/5 in L knee  to increase tolerance for ADL and work activities.  4. Pt will report at 10% limitation on FOTO knee to demonstrate increase in LE functional mobility.   Plan     Plan of care Certification: 12/16/2024 to 4/1/25.    Outpatient Physical Therapy 1-3 times weekly for 16 weeks to include the following interventions: Electrical Stimulation DN, Manual Therapy, Moist Heat/ Ice, Neuromuscular Re-ed, Patient Education, Therapeutic Activities, and Therapeutic Exercise.     Jarad Cameron, PT, DPT

## 2024-12-19 ENCOUNTER — PATIENT MESSAGE (OUTPATIENT)
Dept: ORTHOPEDICS | Facility: CLINIC | Age: 39
End: 2024-12-19

## 2024-12-19 ENCOUNTER — OFFICE VISIT (OUTPATIENT)
Dept: ORTHOPEDICS | Facility: CLINIC | Age: 39
End: 2024-12-19
Payer: COMMERCIAL

## 2024-12-19 VITALS — BODY MASS INDEX: 29.39 KG/M2 | WEIGHT: 216.69 LBS

## 2024-12-19 DIAGNOSIS — S83.242A ACUTE MEDIAL MENISCUS TEAR OF LEFT KNEE, INITIAL ENCOUNTER: Primary | ICD-10-CM

## 2024-12-19 PROCEDURE — 99024 POSTOP FOLLOW-UP VISIT: CPT | Mod: S$GLB,,, | Performed by: ORTHOPAEDIC SURGERY

## 2024-12-19 PROCEDURE — 99999 PR PBB SHADOW E&M-EST. PATIENT-LVL III: CPT | Mod: PBBFAC,,, | Performed by: ORTHOPAEDIC SURGERY

## 2024-12-19 PROCEDURE — 1159F MED LIST DOCD IN RCRD: CPT | Mod: CPTII,S$GLB,, | Performed by: ORTHOPAEDIC SURGERY

## 2024-12-19 NOTE — PROGRESS NOTES
[facility], Orthopedics and Sports Medicine  [facility]    Knee Post-op Visit  12/19/2024       Diagnosis:  Acute medial meniscus tear of left knee, Left knee synovitis    Procedure:   (12/13/2024) Left knee medial meniscectomy, Left limited synovectomy    Subjective:      Dennis Leos Jr. is a 39 y.o. male who is now 6 days status post left knee surgery. The patient is not having any pain. The patient denies fever, wound drainage, increasing redness, pus, increasing pain, increasing swelling. Post op problems reported: none.    Doing well.  No crutches. Went to therapy one time.      Objective:      Ortho/SPM Exam  General: alert, appears stated age, and cooperative   Gait: antalgic  Sutures: Sutures in place.  Incision: healing well, no significant drainage, no dehiscence, no significant erythema  Tenderness: none  Range of Motion: Extension 0 degrees  Flexion 90 degrees  Stability: Normal  Strength: Improving  Vascular: CR<2s. Palpable radial pulse    Imaging:  None today      Assessment:       The patient is status post left knee surgery.  The encounter diagnosis was Acute medial meniscus tear of left knee, initial encounter. Doing well postoperatively. Continuation of post-op rehab course is recommended at this time. All of the patient's questions were answered.    Continue activity with therapy exercises.      Plan:      Tylenol 650mg TID PRN for pain.  Continue physical therapy.  Follow up at 6 weeks after surgery.  Will go to PCP at 2 weeks after surgery to remove sutures       Perez Sampson IV, MD   of Clinical Orthopedics  Department of Orthopedic Surgery  [facility]  Office: [phone]  Website: [email or web address]

## 2024-12-23 ENCOUNTER — TELEPHONE (OUTPATIENT)
Dept: ORTHOPEDICS | Facility: CLINIC | Age: 39
End: 2024-12-23
Payer: COMMERCIAL

## 2024-12-23 ENCOUNTER — CLINICAL SUPPORT (OUTPATIENT)
Dept: REHABILITATION | Facility: HOSPITAL | Age: 39
End: 2024-12-23
Payer: COMMERCIAL

## 2024-12-23 DIAGNOSIS — R53.1 WEAKNESS: ICD-10-CM

## 2024-12-23 DIAGNOSIS — R26.9 ABNORMAL GAIT: ICD-10-CM

## 2024-12-23 DIAGNOSIS — M25.562 ACUTE PAIN OF LEFT KNEE: Primary | ICD-10-CM

## 2024-12-23 PROCEDURE — 97530 THERAPEUTIC ACTIVITIES: CPT | Mod: CQ

## 2024-12-23 PROCEDURE — 97112 NEUROMUSCULAR REEDUCATION: CPT | Mod: CQ

## 2024-12-23 NOTE — PROGRESS NOTES
Physical Therapy Daily Treatment Note     Name: Dennis Leos Jr.  Clinic Number: [id number]  Therapy Diagnosis: No diagnosis found.     Physician: Perez Sampson IV, MD     Physician Orders: PT Eval and Treat   Medical Diagnosis from Referral: S83.242A (ICD-10-CM) - Acute medial meniscus tear of left knee, initial encounter   Evaluation Date: 12/16/2024  Authorization Period Expiration: 12/31/24  Plan of Care Expiration: 4/1/25  Progress Note Due: 12/31/24  Visit # / Visits authorized: 1/ 1   FOTO: 1/1     Precautions: Standard      Time In: 0900  Time Out: 1000  Total Appointment Time (timed & untimed codes): 60 minutes     Procedure:               Left knee medial meniscectomy (94731)  Left limited synovectomy (11999)        Findings:                                              Effusion: Present              Patella: Normal cartilage              Trochlea: Normal              Medial Compartment:   Medial cartilage: Grade II chondromalacia                          Medial meniscus:  bucket handle tear macerated, red-white zone              Lateral Compartment:   Lateral cartilage: Grade II chondromalacia                          Lateral meniscus: Normal         ACL/PCL: Normal              Other: None      Post-Operative Management:  The patient will be NWB on the operative extremity until there is full return of motor function s/p block, then the patient can WBAT.  After discharge, the patient will follow up in my office ([phone]) in 14 days after surgery.  The patient will be treated with DVT prophylaxis in the post operative period.        Pt reports: no pain at present time   He was compliant with home exercise program.  Response to previous treatment:  no issue   Functional change:  cutting 4 lawns this weekend     Pain: 0/10  Location: left knee      Objective     Dennis received therapeutic exercises to develop strength, endurance, ROM, flexibility, posture, and core stabilization for  minutes  "including:      Dennis received the following manual therapy techniques: Joint mobilizations, Manual traction, and Soft tissue Mobilization were applied to the: L knee  for  minutes, including:      Dennis participated in neuromuscular re-education activities to improve: Balance, Coordination, Kinesthetic, Sense, Proprioception, and Posture for 45 minutes. The following activities were included:  R knee heep prop QS  2x20/3"  QS > Hyperext 2x20/5"  SAQ 1# 2x20/3"   LAQ 1# 2x20/3"     Dennis participated in dynamic functional therapeutic activities to improve functional performance for 10  minutes, including:  Stationary bike 10'     Dennis participated in gait training to improve functional mobility and safety for   minutes, including:      Home Exercises Provided and Patient Education Provided     Education provided:   - Continued with POC     Written Home Exercises Provided: yes.  Exercises were reviewed and Dennis was able to demonstrate them prior to the end of the session.  Dennis demonstrated good  understanding of the education provided.       Assessment   Pt tolerating tx well. Ambulating into tx w/o AD and no gait deviation. Cut four lawns over the weekend without issues. Instructed to monitor with swelling/pain and RICE. Continue with ROM and quad strengthening. VC/TC for correcting form/technique. Progress as tolerated.     Dennis Is progressing well towards his goals.   Pt prognosis is Good.     Pt will continue to benefit from skilled outpatient physical therapy to address the deficits listed in the problem list box on initial evaluation, provide pt/family education and to maximize pt's level of independence in the home and community environment.     Pt's spiritual, cultural and educational needs considered and pt agreeable to plan of care and goals.    Anticipated barriers to physical therapy: none     Goals:  GOALS: Short Term Goals:  6 weeks  1.Report decreased L knee pain  < / =  0/10  to increase tolerance for " exercise  2. Increase knee ROM to full in order to be able to perform ADLs without difficulty.  3. Increase strength by 1/3 MMT grade in L knee  to increase tolerance for ADL and work activities.  4. Pt to tolerate HEP to improve ROM and independence with ADL's     Long Term Goals: 12-16 weeks  1.Report decreased L knee pain < / = 0/10  to increase tolerance for exercise  2.Patient goal: return to gym without limitation  3.Increase strength to >/= 4+/5 in L knee  to increase tolerance for ADL and work activities.  4. Pt will report at 10% limitation on FOTO knee to demonstrate increase in LE functional mobility.     Plan     Continue with POC     Chase Liu, PTA, STS

## 2024-12-27 ENCOUNTER — OFFICE VISIT (OUTPATIENT)
Dept: ORTHOPEDICS | Facility: CLINIC | Age: 39
End: 2024-12-27
Payer: COMMERCIAL

## 2024-12-27 VITALS — HEIGHT: 72 IN | BODY MASS INDEX: 29.35 KG/M2 | WEIGHT: 216.69 LBS

## 2024-12-27 DIAGNOSIS — Z98.890 STATUS POST ARTHROSCOPY OF LEFT KNEE: Primary | ICD-10-CM

## 2024-12-27 PROCEDURE — 99999 PR PBB SHADOW E&M-EST. PATIENT-LVL III: CPT | Mod: PBBFAC,,,

## 2024-12-27 NOTE — PROGRESS NOTES
[facility], Orthopedics and Sports Medicine  [facility]    Knee Post-op Visit  12/27/2024       Diagnosis:  Acute medial meniscus tear of left knee, Left knee synovitis    Procedure:   (12/13/2024) Left knee medial meniscectomy, Left limited synovectomy    (12/27/2024) pt presents today for suture removal. Has begun PT    Subjective:      Dennis Leos Jr. is a 39 y.o. male who is now 6 days status post left knee surgery. The patient is not having any pain. The patient denies fever, wound drainage, increasing redness, pus, increasing pain, increasing swelling. Post op problems reported: none.    Doing well.  No crutches. Went to therapy one time.      Objective:      Ortho/SPM Exam  General: alert, appears stated age, and cooperative   Gait: antalgic  Sutures: removed  Incision: healing well, no drainage, no dehiscence, no erythema  Tenderness: none  Range of Motion: Extension 0 degrees  Flexion 90 degrees  Stability: Normal  Strength: Improving  Vascular: CR<2s. Palpable radial pulse    Imaging:  None today      Assessment:       The patient is status post left knee surgery.  There were no encounter diagnoses. Doing well postoperatively. Continuation of post-op rehab course is recommended at this time. All of the patient's questions were answered.    Continue activity with therapy exercises.      Plan:      Tylenol 650mg TID PRN for pain.  Continue physical therapy.  Follow up at 6 weeks after surgery. With Dr Adrián Meehan to get incisions wet, but we discussed no submerging under water for 7-10 more days     All of the patient's questions were answered and the patient will contact us if they have any questions or concerns in the interim.      Kimberly Tom PA-C  [facility]  Orthopedic Surgery

## 2024-12-30 ENCOUNTER — CLINICAL SUPPORT (OUTPATIENT)
Dept: REHABILITATION | Facility: HOSPITAL | Age: 39
End: 2024-12-30
Payer: COMMERCIAL

## 2024-12-30 DIAGNOSIS — R26.9 ABNORMAL GAIT: ICD-10-CM

## 2024-12-30 DIAGNOSIS — M25.562 ACUTE PAIN OF LEFT KNEE: Primary | ICD-10-CM

## 2024-12-30 DIAGNOSIS — R53.1 WEAKNESS: ICD-10-CM

## 2024-12-30 PROCEDURE — 97530 THERAPEUTIC ACTIVITIES: CPT | Performed by: PHYSICAL THERAPIST

## 2024-12-30 PROCEDURE — 97112 NEUROMUSCULAR REEDUCATION: CPT | Performed by: PHYSICAL THERAPIST

## 2024-12-30 PROCEDURE — 97110 THERAPEUTIC EXERCISES: CPT | Performed by: PHYSICAL THERAPIST

## 2024-12-30 NOTE — PROGRESS NOTES
Physical Therapy Daily Treatment Note     Name: Dennis Leos Jr.  Clinic Number: [id number]  Therapy Diagnosis: No diagnosis found.     Physician: Perez Sampson IV, MD     Physician Orders: PT Eval and Treat   Medical Diagnosis from Referral: S83.242A (ICD-10-CM) - Acute medial meniscus tear of left knee, initial encounter   Evaluation Date: 12/16/2024  Authorization Period Expiration: 12/31/24  Plan of Care Expiration: 4/1/25  Progress Note Due: 12/31/24  Visit # / Visits authorized: 1/ 1   FOTO: 1/1     Precautions: Standard      Time In: 0700  Time Out: 0800  Total Appointment Time (timed & untimed codes): 60 minutes     Procedure:               Left knee medial meniscectomy (27945)  Left limited synovectomy (71862)        Findings:                                              Effusion: Present              Patella: Normal cartilage              Trochlea: Normal              Medial Compartment:   Medial cartilage: Grade II chondromalacia                          Medial meniscus:  bucket handle tear macerated, red-white zone              Lateral Compartment:   Lateral cartilage: Grade II chondromalacia                          Lateral meniscus: Normal         ACL/PCL: Normal              Other: None      Post-Operative Management:  The patient will be NWB on the operative extremity until there is full return of motor function s/p block, then the patient can WBAT.  After discharge, the patient will follow up in my office ([phone]) in 14 days after surgery.  The patient will be treated with DVT prophylaxis in the post operative period.        Pt reports: dong well. Elliptical at gym  He was compliant with home exercise program.  Response to previous treatment:  no issue   Functional change:  cutting 4 lawns this weekend     Pain: 0/10  Location: left knee      Objective     Dennis received therapeutic exercises to develop strength, endurance, ROM, flexibility, posture, and core stabilization for 23 minutes  "including:  Leg press 3 x 15 0-90 80 lbs  SLR  3 x 20  Prone quad/hip flexor stretch 3 x 1 mins  Hamstring stretch 3 x 1 mins      Dennis received the following manual therapy techniques: Joint mobilizations, Manual traction, and Soft tissue Mobilization were applied to the: L knee  for  minutes, including:      Dennis participated in neuromuscular re-education activities to improve: Balance, Coordination, Kinesthetic, Sense, Proprioception, and Posture for 15 minutes. The following activities were included:  R knee heep prop QS  2x20/3"  QS > Hyperext 2x20/5"       Dennis participated in dynamic functional therapeutic activities to improve functional performance for 20 minutes, including:    Bike 10 mins level 5  Step ups 6 inch 2 x 10  Step up 6 inch with red power band TKE 3  x10  Dennis participated in gait training to improve functional mobility and safety for   minutes, including:      Home Exercises Provided and Patient Education Provided     Education provided:   - Continued with POC     Written Home Exercises Provided: yes.  Exercises were reviewed and Dennis was able to demonstrate them prior to the end of the session.  Dennis demonstrated good  understanding of the education provided.       Assessment   Pt is doing well. Good active hyper extension, flexion to 120 in prone. Tolerated CKC exercises well at low loads. Will have him perform light single leg leg press at gym to 90 deg. Reassess next visit. And progress loading and ROM as tolerated.     Dennis Is progressing well towards his goals.   Pt prognosis is Good.     Pt will continue to benefit from skilled outpatient physical therapy to address the deficits listed in the problem list box on initial evaluation, provide pt/family education and to maximize pt's level of independence in the home and community environment.     Pt's spiritual, cultural and educational needs considered and pt agreeable to plan of care and goals.    Anticipated barriers to physical therapy: none "     Goals:  GOALS: Short Term Goals:  6 weeks  1.Report decreased L knee pain  < / =  0/10  to increase tolerance for exercise  2. Increase knee ROM to full in order to be able to perform ADLs without difficulty.  3. Increase strength by 1/3 MMT grade in L knee  to increase tolerance for ADL and work activities.  4. Pt to tolerate HEP to improve ROM and independence with ADL's     Long Term Goals: 12-16 weeks  1.Report decreased L knee pain < / = 0/10  to increase tolerance for exercise  2.Patient goal: return to gym without limitation  3.Increase strength to >/= 4+/5 in L knee  to increase tolerance for ADL and work activities.  4. Pt will report at 10% limitation on FOTO knee to demonstrate increase in LE functional mobility.     Plan     Continue with POC     Jarad Cameron, PT, DPT,

## 2025-01-22 ENCOUNTER — PATIENT MESSAGE (OUTPATIENT)
Dept: ORTHOPEDICS | Facility: CLINIC | Age: 40
End: 2025-01-22
Payer: COMMERCIAL

## 2025-01-30 ENCOUNTER — TELEPHONE (OUTPATIENT)
Dept: ORTHOPEDICS | Facility: CLINIC | Age: 40
End: 2025-01-30
Payer: COMMERCIAL
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
## 2025-03-13 ENCOUNTER — OFFICE VISIT (OUTPATIENT)
Dept: ORTHOPEDICS | Facility: CLINIC | Age: 40
End: 2025-03-13
Payer: COMMERCIAL

## 2025-03-13 VITALS — HEIGHT: 72 IN | BODY MASS INDEX: 29.56 KG/M2 | WEIGHT: 218.25 LBS

## 2025-03-13 DIAGNOSIS — S83.242D ACUTE MEDIAL MENISCUS TEAR OF LEFT KNEE, SUBSEQUENT ENCOUNTER: Primary | ICD-10-CM

## 2025-03-13 PROCEDURE — 99999 PR PBB SHADOW E&M-EST. PATIENT-LVL III: CPT | Mod: PBBFAC,,, | Performed by: PHYSICIAN ASSISTANT

## 2025-03-13 PROCEDURE — 99024 POSTOP FOLLOW-UP VISIT: CPT | Mod: S$GLB,,, | Performed by: PHYSICIAN ASSISTANT

## 2025-03-13 PROCEDURE — 1159F MED LIST DOCD IN RCRD: CPT | Mod: CPTII,S$GLB,, | Performed by: PHYSICIAN ASSISTANT

## 2025-03-13 NOTE — PROGRESS NOTES
[facility], Orthopedics and Sports Medicine  [facility]    Knee Post-op Visit  03/13/2025       Diagnosis:  Acute medial meniscus tear of left knee, Left knee synovitis     Procedure:   (12/13/2024) Left knee medial meniscectomy, Left limited synovectomy     Subjective:      Dennis Leos Jr. is a 39 y.o. male who is now 3 months status post left knee surgery. The patient is not having any pain. The patient denies none, fever, wound drainage, increasing redness, pus, increasing pain, increasing swelling. Post op problems reported: none.    Doing great. Back to full activity. Goes to gym and coaches baseball. Stopped PT and does exercises at the gym.      Objective:      Ortho/SPM Exam  General: alert, appears stated age, and cooperative   Gait: normal  Sutures: Sutures out.  Incision: healing well, no significant drainage, no dehiscence, no significant erythema  Tenderness: none  Range of Motion: Extension 0 degrees  Flexion 130 degrees  Stability: Normal  Strength: Improving  Vascular: CR<2s. Palpable radial pulse    Imaging:  none      Assessment:       The patient is status post left knee surgery.  The encounter diagnosis was Acute medial meniscus tear of left knee, subsequent encounter. Doing well postoperatively. Continuation of post-op rehab course is recommended at this time. All of the patient's questions were answered.    Doing well.  Continue home exercises at the gym.  Cleared for full activity.  Follow-up as needed.     Plan:      Tylenol 650mg TID PRN for pain.  Continue home exercise program.  Weightbearing as tolerated on operative extremity.  Follow up as needed.       Miya Lopez PA-C  Department of Orthopedic Surgery  [facility]  Office: [phone]

## (undated) DEVICE — COVER OVERHEAD SURG LT BLUE

## (undated) DEVICE — DRAPE SURG W/TWL 17 5/8X23

## (undated) DEVICE — SYR 50CC LL

## (undated) DEVICE — ELECTRODE REM PLYHSV RETURN 9

## (undated) DEVICE — PACK ECLIPSE BASIC III SURG

## (undated) DEVICE — GOWN POLY REINF X-LONG 2XL

## (undated) DEVICE — PADDING WYTEX UNDRCST 6INX4YD

## (undated) DEVICE — MAT SURGICAL ECOSUCTIONER

## (undated) DEVICE — COVER BACK TBL HD 2-TIER 72IN

## (undated) DEVICE — DRAPE INVISISHIELD TOWEL SMALL

## (undated) DEVICE — PAD KNEE POLAR XL

## (undated) DEVICE — DRAPE U SPLIT SHEET 54X76IN

## (undated) DEVICE — DRESSING XEROFORM NONADH 1X8IN

## (undated) DEVICE — BANDAGE ESMARK ELASTIC ST 6X9

## (undated) DEVICE — GOWN POLY REINF BRTH SLV LG

## (undated) DEVICE — DRAPE ARTHSCP T ORTHOMAX POUCH

## (undated) DEVICE — GLOVE BIOGEL SKINSENSE PI 8.5

## (undated) DEVICE — ALCOHOL 70% ISOP W/GREEN 16OZ

## (undated) DEVICE — BNDG COFLEX FOAM LF2 ST 6X5YD

## (undated) DEVICE — SOCKINETTE IMPERVIOUS 12X48IN

## (undated) DEVICE — NDL HYPO STD REG BVL 22GX1.5IN

## (undated) DEVICE — MAT SUCTION PUDDLEVAC ORANGE

## (undated) DEVICE — BRACE KNEE T SCOPE PREMIER

## (undated) DEVICE — TUBE SET INFLOW/OUTFLOW

## (undated) DEVICE — PROBE ARTHO ENERGY 90 DEG

## (undated) DEVICE — BANDAGE ROLL COTTN 4.5INX4.1YD

## (undated) DEVICE — GLOVE BIOGEL PIMICRO INDIC 8.5

## (undated) DEVICE — BLADE SURG #15 CARBON STEEL

## (undated) DEVICE — SPONGE LAP 18X18 PREWASHED

## (undated) DEVICE — TOURNIQUET SB QC DP 34X4IN

## (undated) DEVICE — PAD ABDOMINAL STERILE 5X9IN

## (undated) DEVICE — DRAPE STERI INSTRUMENT 1018

## (undated) DEVICE — SUT ETHILON 3/0 18IN PS-1

## (undated) DEVICE — PAD COLD THERAPY KNEE WRAP ON

## (undated) DEVICE — GOWN POLY REINF BRTH SLV XL

## (undated) DEVICE — BANDAGE MATRIX HK LOOP 6IN 5YD

## (undated) DEVICE — BLADE SHAVER LANZA 4.2X13CM

## (undated) DEVICE — GAUZE AVANT SPNG 4PLY STRL 4X4

## (undated) DEVICE — SOL IRR NACL .9% 3000ML

## (undated) DEVICE — PACK SURGERY START

## (undated) DEVICE — COVER PROXIMA MAYO STAND

## (undated) DEVICE — TUBING SUC UNIV W/CONN 12FT

## (undated) DEVICE — DRESSING GAUZE XEROFORM 5X9

## (undated) DEVICE — CUBE COLD THERAPY POLAR CARE

## (undated) DEVICE — BLADE SURG CARBON STEEL SZ11

## (undated) DEVICE — SPONGE COTTON TRAY 4X4IN

## (undated) DEVICE — DRAPE LEGGINGS CUFF 31X48IN

## (undated) DEVICE — APPLICATOR CHLORAPREP ORN 26ML

## (undated) DEVICE — MANIFOLD 4 PORT

## (undated) DEVICE — TOWEL OR XRAY BLUE 17X26IN

## (undated) DEVICE — NDL HYPO STD REG BVL 18GX1.5IN

## (undated) DEVICE — NDL SPINAL 18GX3.5 SPINOCAN